# Patient Record
Sex: FEMALE | Race: WHITE | NOT HISPANIC OR LATINO | Employment: FULL TIME | URBAN - METROPOLITAN AREA
[De-identification: names, ages, dates, MRNs, and addresses within clinical notes are randomized per-mention and may not be internally consistent; named-entity substitution may affect disease eponyms.]

---

## 2017-02-03 ENCOUNTER — OFFICE VISIT (OUTPATIENT)
Dept: FAMILY MEDICINE | Facility: CLINIC | Age: 22
End: 2017-02-03
Payer: COMMERCIAL

## 2017-02-03 VITALS
HEIGHT: 62 IN | DIASTOLIC BLOOD PRESSURE: 76 MMHG | BODY MASS INDEX: 23.24 KG/M2 | OXYGEN SATURATION: 100 % | WEIGHT: 126.3 LBS | SYSTOLIC BLOOD PRESSURE: 108 MMHG | TEMPERATURE: 98.4 F | HEART RATE: 68 BPM

## 2017-02-03 DIAGNOSIS — Z00.00 ENCOUNTER FOR ROUTINE ADULT HEALTH EXAMINATION WITHOUT ABNORMAL FINDINGS: Primary | ICD-10-CM

## 2017-02-03 DIAGNOSIS — Z30.41 SURVEILLANCE FOR BIRTH CONTROL, ORAL CONTRACEPTIVES: ICD-10-CM

## 2017-02-03 PROCEDURE — 87591 N.GONORRHOEAE DNA AMP PROB: CPT | Performed by: NURSE PRACTITIONER

## 2017-02-03 PROCEDURE — 87491 CHLMYD TRACH DNA AMP PROBE: CPT | Performed by: NURSE PRACTITIONER

## 2017-02-03 PROCEDURE — G0145 SCR C/V CYTO,THINLAYER,RESCR: HCPCS | Performed by: NURSE PRACTITIONER

## 2017-02-03 PROCEDURE — 99395 PREV VISIT EST AGE 18-39: CPT | Performed by: NURSE PRACTITIONER

## 2017-02-03 RX ORDER — NORGESTIMATE AND ETHINYL ESTRADIOL 0.25-0.035
1 KIT ORAL DAILY
Qty: 84 TABLET | Refills: 3 | Status: CANCELLED | OUTPATIENT
Start: 2017-02-03

## 2017-02-03 RX ORDER — NORGESTIMATE AND ETHINYL ESTRADIOL 7DAYSX3 28
1 KIT ORAL DAILY
Qty: 84 TABLET | Refills: 3 | Status: SHIPPED | OUTPATIENT
Start: 2017-02-03 | End: 2018-01-05

## 2017-02-03 NOTE — NURSING NOTE
"Chief Complaint   Patient presents with     Physical       Initial /76 mmHg  Pulse 68  Temp(Src) 98.4  F (36.9  C) (Oral)  Ht 5' 1.5\" (1.562 m)  Wt 126 lb 4.8 oz (57.289 kg)  BMI 23.48 kg/m2  SpO2 100%  LMP 01/15/2017 Estimated body mass index is 23.48 kg/(m^2) as calculated from the following:    Height as of this encounter: 5' 1.5\" (1.562 m).    Weight as of this encounter: 126 lb 4.8 oz (57.289 kg).  BP completed using cuff size: meredith HERNANDEZ M.A.      "

## 2017-02-03 NOTE — MR AVS SNAPSHOT
After Visit Summary   2/3/2017    Ella Cavazos    MRN: [id number]           Patient Information     Date Of Birth          1995        Visit Information        Provider Department      2/3/2017 9:00 AM Shirin Hawkins Ra, [facility]        Today's Diagnoses     Surveillance for birth control, oral contraceptives    -  1     Encounter for routine adult health examination without abnormal findings           Care Instructions      Preventive Health Recommendations  Female Ages 18 to 25     Yearly exam:     See your health care provider every year in order to  o Review health changes.   o Discuss preventive care.    o Review your medicines if your doctor has prescribed any.      You should be tested each year for STDs (sexually transmitted diseases).       After age 20, talk to your provider about how often you should have cholesterol testing.      Starting at age 21, get a Pap test every three years. If you have an abnormal result, your doctor may have you test more often.      If you are at risk for diabetes, you should have a diabetes test (fasting glucose).     Shots:     Get a flu shot each year.     Get a tetanus shot every 10 years.     Consider getting the shot (vaccine) that prevents cervical cancer (Gardasil).    Nutrition:     Eat at least 5 servings of fruits and vegetables each day.    Eat whole-grain bread, whole-wheat pasta and brown rice instead of white grains and rice.    Talk to your provider about Calcium and Vitamin D.     Lifestyle    Exercise at least 150 minutes a week each week (30 minutes a day, 5 days a week). This will help you control your weight and prevent disease.    Limit alcohol to one drink per day.    No smoking.     Wear sunscreen to prevent skin cancer.    See your dentist every six months for an exam and cleaning.  Preventive Health Recommendations  Female Ages 18 to 25     Yearly exam:   See your health care provider every year in  order to  Review health changes.   Discuss preventive care.    Review your medicines if your doctor has prescribed any.    You should be tested each year for STDs (sexually transmitted diseases).     After age 20, talk to your provider about how often you should have cholesterol testing.    Starting at age 21, get a Pap test every three years. If you have an abnormal result, your doctor may have you test more often.    If you are at risk for diabetes, you should have a diabetes test (fasting glucose).     Shots:   Get a flu shot each year.   Get a tetanus shot every 10 years.   Consider getting the shot (vaccine) that prevents cervical cancer (Gardasil).    Nutrition:   Eat at least 5 servings of fruits and vegetables each day.  Eat whole-grain bread, whole-wheat pasta and brown rice instead of white grains and rice.  Talk to your provider about Calcium and Vitamin D.     Lifestyle  Exercise at least 150 minutes a week each week (30 minutes a day, 5 days a week). This will help you control your weight and prevent disease.  Limit alcohol to one drink per day.  No smoking.   Wear sunscreen to prevent skin cancer.  See your dentist every six months for an exam and cleaning.        Follow-ups after your visit        Who to contact     If you have questions or need follow up information about today's clinic visit or your schedule please contact [facility] directly at [phone].  Normal or non-critical lab and imaging results will be communicated to you by MyChart, letter or phone within 4 business days after the clinic has received the results. If you do not hear from us within 7 days, please contact the clinic through MyChart or phone. If you have a critical or abnormal lab result, we will notify you by phone as soon as possible.  Submit refill requests through happ[employer]! or call your pharmacy and they will forward the refill request to us. Please allow 3 business days for your refill to be completed.     "      Additional Information About Your Visit        MyChart Information     [employer] gives you secure access to your electronic health record. If you see a primary care provider, you can also send messages to your care team and make appointments. If you have questions, please call your primary care clinic.  If you do not have a primary care provider, please call [phone] and they will assist you.        Care EveryWhere ID     This is your Care EveryWhere ID. This could be used by other organizations to access your [facility] medical records  [id number]        Your Vitals Were     Pulse Temperature Height BMI (Body Mass Index) Pulse Oximetry Last Period    68 98.4  F (36.9  C) (Oral) 5' 1.5\" (1.562 m) 23.48 kg/m2 100% 01/15/2017       Blood Pressure from Last 3 Encounters:   02/03/17 108/76   02/05/16 108/64   09/04/15 104/62    Weight from Last 3 Encounters:   02/03/17 126 lb 4.8 oz (57.289 kg)   02/05/16 121 lb (54.885 kg)   09/04/15 119 lb 9.6 oz (54.25 kg)              We Performed the Following     Chlamydia trachomatis PCR     Neisseria gonorrhoeae PCR     Pap imaged thin layer screen only - recommended age 21 - 24 years          Today's Medication Changes          These changes are accurate as of: 2/3/17  9:48 AM.  If you have any questions, ask your nurse or doctor.               Start taking these medicines.        Dose/Directions    norgestim-eth estrad triphasic 0.18/0.215/0.25 MG-35 MCG per tablet   Commonly known as:  TRINESSA (28)   Used for:  Surveillance for birth control, oral contraceptives   Started by:  Shirin Hawkins Ra, APRN CNP        Dose:  1 tablet   Take 1 tablet by mouth daily   Quantity:  84 tablet   Refills:  3            Where to get your medicines      These medications were sent to [facility] [address] [place] [address] [address]  [address] [facility] [address]     Phone:  [phone]    - norgestim-eth estrad triphasic " 0.18/0.215/0.25 MG-35 MCG per tablet             Primary Care Provider    None Specified       No primary provider on file.        Thank you!     Thank you for choosing [facility]  for your care. Our goal is always to provide you with excellent care. Hearing back from our patients is one way we can continue to improve our services. Please take a few minutes to complete the written survey that you may receive in the mail after your visit with us. Thank you!             Your Updated Medication List - Protect others around you: Learn how to safely use, store and throw away your medicines at www.disposemymeds.org.          This list is accurate as of: 2/3/17  9:48 AM.  Always use your most recent med list.                   Brand Name Dispense Instructions for use    norgestim-eth estrad triphasic 0.18/0.215/0.25 MG-35 MCG per tablet    TRINESSA (28)    84 tablet    Take 1 tablet by mouth daily       norgestimate-ethinyl estradiol 0.25-35 MG-MCG per tablet    ORTHO-CYCLEN, SPRINTEC    84 tablet    Take 1 tablet by mouth daily

## 2017-02-03 NOTE — PROGRESS NOTES
"   SUBJECTIVE:     CC: Ella Cavazos is an 22 year old woman who presents for preventive health visit.     Healthy Habits:    Do you get at least three servings of calcium containing foods daily (dairy, green leafy vegetables, etc.)? {YES/NO, DAIRY INTAKE:843217::\"yes\"}    Amount of exercise or daily activities, outside of work: {AMOUNT EXERCISE:811510}    Problems taking medications regularly {Yes /No default:271189::\"No\"}    Medication side effects: {Yes /No default.:719862::\"No\"}    Have you had an eye exam in the past two years? {YESNOBLANK:432623}    Do you see a dentist twice per year? {YESNOBLANK:484547}    Do you have sleep apnea, excessive snoring or daytime drowsiness?{YESNOBLANK:264433}    {Outside tests to abstract? :264841}    {additional problems to add:326444}    Today's PHQ-2 Score:   PHQ-2 ( 1999 Pfizer) 1/31/2017 2/5/2016   Q1: Little interest or pleasure in doing things - 0   Q2: Feeling down, depressed or hopeless - 0   PHQ-2 Score - 0   Little interest or pleasure in doing things Not at all -   Feeling down, depressed or hopeless Not at all -   PHQ-2 Score 0 -     {PHQ-2 LOOK IN ASSESSMENTS :930968}  Abuse: Current or Past(Physical, Sexual or Emotional)- {YES/NO/NA:539135}  Do you feel safe in your environment - {YES/NO/NA:159316}    Social History   Substance Use Topics     Smoking status: Never Smoker      Smokeless tobacco: Not on file     Alcohol Use: 0.0 oz/week     0 Standard drinks or equivalent per week     {ETOH AUDIT:315835}    No results for input(s): CHOL, HDL, LDL, TRIG, CHOLHDLRATIO, NHDL in the last 13563 hours.    Reviewed orders with patient.  Reviewed health maintenance and updated orders accordingly - {Yes/No:077332::\"Yes\"}    Mammo Decision Support:  {Mammo:428245}    Pertinent mammograms are reviewed under the imaging tab.  History of abnormal Pap smear: {PAP HX:984636}  All Histories reviewed and updated in Epic.  {HISTORY OPTIONS:703583}    ROS:  {FEMALE PREVENTATIVE " "ROS:043769}    {CHRONICPROBDATA:992865}  OBJECTIVE:     There were no vitals taken for this visit.  EXAM:  {Exam Choices:144763}    ASSESSMENT/PLAN:     {Diag Picklist:019239}    COUNSELING:   {FEMALE COUNSELING MESSAGES:531843::\"Reviewed preventive health counseling, as reflected in patient instructions\"}    {Blood Pressure Screenin}     reports that she has never smoked. She does not have any smokeless tobacco history on file.  {Tobacco Cessation needed for ACO -- Delete if patient is a non-smoker:201402}  Estimated body mass index is 22.50 kg/(m^2) as calculated from the following:    Height as of 16: 5' 1.5\" (1.562 m).    Weight as of 16: 121 lb (54.885 kg).   {Weight Management Plan needed for ACO:749458}    Counseling Resources:  ATP IV Guidelines  Pooled Cohorts Equation Calculator  Breast Cancer Risk Calculator  FRAX Risk Assessment  ICSI Preventive Guidelines  Dietary Guidelines for Americans, 2010  USDA's MyPlate  ASA Prophylaxis  Lung CA Screening    [facility] Aviles Ra [facility]  "

## 2017-02-03 NOTE — PROGRESS NOTES
SUBJECTIVE:     CC: Ella Cavazos is an 22 year old woman who presents for preventive health visit.     Physical  Annual:     Getting at least 3 servings of Calcium per day::  NO    Bi-annual eye exam::  NO    Dental care twice a year::  NO    Sleep apnea or symptoms of sleep apnea::  None    Diet::  Regular (no restrictions)    Frequency of exercise::  1 day/week    Duration of exercise::  15-30 minutes    Taking medications regularly::  Yes    Medication side effects::  Not applicable    Additional concerns today::  No      OCP refill.  Sexually active, male parther.  No htn, blood clots, migraines, smoking.  Regular periods.  Prefers tri-sprintec, which she was on during college.    Also would like std screening.    Today's PHQ-2 Score:   PHQ-2 ( 1999 Pfizer) 2/3/2017   Q1: Little interest or pleasure in doing things 0   Q2: Feeling down, depressed or hopeless 0   PHQ-2 Score 0   Little interest or pleasure in doing things -   Feeling down, depressed or hopeless -   PHQ-2 Score -       Abuse: Current or Past(Physical, Sexual or Emotional)- No  Do you feel safe in your environment - Yes    Social History   Substance Use Topics     Smoking status: Never Smoker      Smokeless tobacco: Not on file     Alcohol Use: 0.0 oz/week     0 Standard drinks or equivalent per week     The patient does not drink >3 drinks per day nor >7 drinks per week.    No results for input(s): CHOL, HDL, LDL, TRIG, CHOLHDLRATIO, NHDL in the last 53966 hours.    Reviewed orders with patient.  Reviewed health maintenance and updated orders accordingly - Yes    Mammo Decision Support:  Mammogram not appropriate for this patient based on age.    Pertinent mammograms are reviewed under the imaging tab.  History of abnormal Pap smear: NO - age 21-29 PAP every 3 years recommended  All Histories reviewed and updated in Epic.      ROS:  C: NEGATIVE for fever, chills, change in weight  I: NEGATIVE for worrisome rashes, moles or lesions  E:  "NEGATIVE for vision changes or irritation  ENT: NEGATIVE for ear, mouth and throat problems  R: NEGATIVE for significant cough or SOB  B: NEGATIVE for masses, tenderness or discharge  CV: NEGATIVE for chest pain, palpitations or peripheral edema  GI: NEGATIVE for nausea, abdominal pain, heartburn, or change in bowel habits  : NEGATIVE for unusual urinary or vaginal symptoms. Periods are regular.  M: NEGATIVE for significant arthralgias or myalgia  N: NEGATIVE for weakness, dizziness or paresthesias  P: NEGATIVE for changes in mood or affect    Problem list, Medication list, Allergies, and Medical/Social/Surgical histories reviewed in [facility] and updated as appropriate.  Patient Active Problem List   Diagnosis     CARDIOVASCULAR SCREENING; LDL GOAL LESS THAN 160     Surveillance for birth control, oral contraceptives     Past Surgical History   Procedure Laterality Date     Dental surgery       wisdom teeth, uncomplicated       Social History   Substance Use Topics     Smoking status: Never Smoker      Smokeless tobacco: Not on file     Alcohol Use: 0.0 oz/week     0 Standard drinks or equivalent per week     Family History   Problem Relation Age of Onset     Colon Cancer Maternal Grandfather          OBJECTIVE:     /76 mmHg  Pulse 68  Temp(Src) 98.4  F (36.9  C) (Oral)  Ht 5' 1.5\" (1.562 m)  Wt 126 lb 4.8 oz (57.289 kg)  BMI 23.48 kg/m2  SpO2 100%  LMP 01/15/2017  EXAM:  GENERAL: healthy, alert and no distress  EYES: Eyes grossly normal to inspection, PERRL and conjunctivae and sclerae normal  HENT: ear canals and TM's normal, nose and mouth without ulcers or lesions  NECK: no adenopathy, no asymmetry, masses, or scars and thyroid normal to palpation  RESP: lungs clear to auscultation - no rales, rhonchi or wheezes  BREAST: normal without masses, tenderness or nipple discharge and no palpable axillary masses or adenopathy  CV: regular rate and rhythm, normal S1 S2, no S3 or S4, no murmur, click or rub, " "no peripheral edema and peripheral pulses strong  ABDOMEN: soft, nontender, no hepatosplenomegaly, no masses and bowel sounds normal   (female): normal female external genitalia, normal urethral meatus, vaginal mucosa pink, moist, well rugated, and normal cervix/adnexa/uterus without masses or discharge  MS: no gross musculoskeletal defects noted, no edema  SKIN: no suspicious lesions or rashes  NEURO: Normal strength and tone, mentation intact and speech normal  PSYCH: mentation appears normal, affect normal/bright    ASSESSMENT/PLAN:     1. Encounter for routine adult health examination without abnormal findings  22 y.o. Female, here for WWE.  No concerns.  - Pap imaged thin layer screen only - recommended age 21 - 24 years  - Neisseria gonorrhoeae PCR  - Chlamydia trachomatis PCR    2. Surveillance for birth control, oral contraceptives  No known contraindications.  Refilled.  - norgestim-eth estrad triphasic (TRINESSA, 28,) 0.18/0.215/0.25 MG-35 MCG per tablet; Take 1 tablet by mouth daily  Dispense: 84 tablet; Refill: 3    COUNSELING:  Reviewed preventive health counseling, as reflected in patient instructions       Regular exercise       Healthy diet/nutrition       Contraception         reports that she has never smoked. She does not have any smokeless tobacco history on file.    Estimated body mass index is 23.48 kg/(m^2) as calculated from the following:    Height as of this encounter: 5' 1.5\" (1.562 m).    Weight as of this encounter: 126 lb 4.8 oz (57.289 kg).       Counseling Resources:  ATP IV Guidelines  Pooled Cohorts Equation Calculator  Breast Cancer Risk Calculator  FRAX Risk Assessment  ICSI Preventive Guidelines  Dietary Guidelines for Americans, 2010  USDA's MyPlate  ASA Prophylaxis  Lung CA Screening    Shirin Hawkins, APRN CNP  [facility]    "

## 2017-02-03 NOTE — PATIENT INSTRUCTIONS
Preventive Health Recommendations  Female Ages 18 to 25     Yearly exam:     See your health care provider every year in order to  o Review health changes.   o Discuss preventive care.    o Review your medicines if your doctor has prescribed any.      You should be tested each year for STDs (sexually transmitted diseases).       After age 20, talk to your provider about how often you should have cholesterol testing.      Starting at age 21, get a Pap test every three years. If you have an abnormal result, your doctor may have you test more often.      If you are at risk for diabetes, you should have a diabetes test (fasting glucose).     Shots:     Get a flu shot each year.     Get a tetanus shot every 10 years.     Consider getting the shot (vaccine) that prevents cervical cancer (Gardasil).    Nutrition:     Eat at least 5 servings of fruits and vegetables each day.    Eat whole-grain bread, whole-wheat pasta and brown rice instead of white grains and rice.    Talk to your provider about Calcium and Vitamin D.     Lifestyle    Exercise at least 150 minutes a week each week (30 minutes a day, 5 days a week). This will help you control your weight and prevent disease.    Limit alcohol to one drink per day.    No smoking.     Wear sunscreen to prevent skin cancer.    See your dentist every six months for an exam and cleaning.  Preventive Health Recommendations  Female Ages 18 to 25     Yearly exam:   See your health care provider every year in order to  Review health changes.   Discuss preventive care.    Review your medicines if your doctor has prescribed any.    You should be tested each year for STDs (sexually transmitted diseases).     After age 20, talk to your provider about how often you should have cholesterol testing.    Starting at age 21, get a Pap test every three years. If you have an abnormal result, your doctor may have you test more often.    If you are at risk for diabetes, you should have a  diabetes test (fasting glucose).     Shots:   Get a flu shot each year.   Get a tetanus shot every 10 years.   Consider getting the shot (vaccine) that prevents cervical cancer (Gardasil).    Nutrition:   Eat at least 5 servings of fruits and vegetables each day.  Eat whole-grain bread, whole-wheat pasta and brown rice instead of white grains and rice.  Talk to your provider about Calcium and Vitamin D.     Lifestyle  Exercise at least 150 minutes a week each week (30 minutes a day, 5 days a week). This will help you control your weight and prevent disease.  Limit alcohol to one drink per day.  No smoking.   Wear sunscreen to prevent skin cancer.  See your dentist every six months for an exam and cleaning.

## 2017-02-05 LAB
C TRACH DNA SPEC QL NAA+PROBE: NORMAL
N GONORRHOEA DNA SPEC QL NAA+PROBE: NORMAL
SPECIMEN SOURCE: NORMAL
SPECIMEN SOURCE: NORMAL

## 2017-02-07 LAB
COPATH REPORT: NORMAL
PAP: NORMAL

## 2018-01-05 ENCOUNTER — OFFICE VISIT (OUTPATIENT)
Dept: FAMILY MEDICINE | Facility: CLINIC | Age: 23
End: 2018-01-05
Payer: COMMERCIAL

## 2018-01-05 VITALS
BODY MASS INDEX: 22.9 KG/M2 | SYSTOLIC BLOOD PRESSURE: 100 MMHG | RESPIRATION RATE: 20 BRPM | DIASTOLIC BLOOD PRESSURE: 62 MMHG | WEIGHT: 121.3 LBS | TEMPERATURE: 96.7 F | HEIGHT: 61 IN | OXYGEN SATURATION: 99 % | HEART RATE: 73 BPM

## 2018-01-05 DIAGNOSIS — B37.31 YEAST VAGINITIS: ICD-10-CM

## 2018-01-05 DIAGNOSIS — Z30.41 SURVEILLANCE FOR BIRTH CONTROL, ORAL CONTRACEPTIVES: ICD-10-CM

## 2018-01-05 DIAGNOSIS — Z11.3 SCREENING EXAMINATION FOR VENEREAL DISEASE: Primary | ICD-10-CM

## 2018-01-05 LAB
GLUCOSE BLD-MCNC: 87 MG/DL (ref 70–99)
SPECIMEN SOURCE: ABNORMAL
WET PREP SPEC: ABNORMAL

## 2018-01-05 PROCEDURE — 87491 CHLMYD TRACH DNA AMP PROBE: CPT | Performed by: NURSE PRACTITIONER

## 2018-01-05 PROCEDURE — 87210 SMEAR WET MOUNT SALINE/INK: CPT | Performed by: NURSE PRACTITIONER

## 2018-01-05 PROCEDURE — 99213 OFFICE O/P EST LOW 20 MIN: CPT | Performed by: NURSE PRACTITIONER

## 2018-01-05 PROCEDURE — 36415 COLL VENOUS BLD VENIPUNCTURE: CPT | Performed by: NURSE PRACTITIONER

## 2018-01-05 PROCEDURE — 82947 ASSAY GLUCOSE BLOOD QUANT: CPT | Performed by: NURSE PRACTITIONER

## 2018-01-05 PROCEDURE — 87591 N.GONORRHOEAE DNA AMP PROB: CPT | Performed by: NURSE PRACTITIONER

## 2018-01-05 RX ORDER — FLUCONAZOLE 150 MG/1
150 TABLET ORAL
Qty: 4 TABLET | Refills: 0 | Status: SHIPPED | OUTPATIENT
Start: 2018-01-05 | End: 2021-01-08

## 2018-01-05 RX ORDER — NORGESTIMATE AND ETHINYL ESTRADIOL 7DAYSX3 28
1 KIT ORAL DAILY
Qty: 84 TABLET | Refills: 3 | Status: SHIPPED | OUTPATIENT
Start: 2018-01-05 | End: 2018-11-19

## 2018-01-05 NOTE — NURSING NOTE
"Chief Complaint   Patient presents with     Contraception     Vaginal Problem     STD       Initial /62 (BP Location: Right arm, Patient Position: Chair, Cuff Size: Adult Regular)  Pulse 73  Temp 96.7  F (35.9  C) (Tympanic)  Resp 20  Ht 5' 1\" (1.549 m)  Wt 121 lb 4.8 oz (55 kg)  LMP 12/19/2017 (Approximate)  SpO2 99%  BMI 22.92 kg/m2 Estimated body mass index is 22.92 kg/(m^2) as calculated from the following:    Height as of this encounter: 5' 1\" (1.549 m).    Weight as of this encounter: 121 lb 4.8 oz (55 kg).  Medication Reconciliation: complete     Caridad Llamas CMA      "

## 2018-01-05 NOTE — MR AVS SNAPSHOT
After Visit Summary   1/5/2018    Ella Cavazos    MRN: [id number]           Patient Information     Date Of Birth          1995        Visit Information        Provider Department      1/5/2018 1:20 PM Silvia Goins APRN CNP [facility]        Today's Diagnoses     Screening examination for venereal disease    -  1    Yeast vaginitis        Surveillance for birth control, oral contraceptives          Care Instructions    Take one pill of diflucan today. Repeat pill once weekly for 4 weeks.    You can also use monistat.     Return to clinic if your symptoms come back. You can see me or make a lab only appointment.          Follow-ups after your visit        Who to contact     If you have questions or need follow up information about today's clinic visit or your schedule please contact [facility] directly at [phone].  Normal or non-critical lab and imaging results will be communicated to you by MyChart, letter or phone within 4 business days after the clinic has received the results. If you do not hear from us within 7 days, please contact the clinic through Shareaholichart or phone. If you have a critical or abnormal lab result, we will notify you by phone as soon as possible.  Submit refill requests through STAR FESTIVAL or call your pharmacy and they will forward the refill request to us. Please allow 3 business days for your refill to be completed.          Additional Information About Your Visit        Shareaholichart Information     STAR FESTIVAL gives you secure access to your electronic health record. If you see a primary care provider, you can also send messages to your care team and make appointments. If you have questions, please call your primary care clinic.  If you do not have a primary care provider, please call [phone] and they will assist you.        Care EveryWhere ID     This is your Care EveryWhere ID. This could be used by other organizations to access your  "[facility] medical records  [id number]        Your Vitals Were     Pulse Temperature Respirations Height Last Period Pulse Oximetry    73 96.7  F (35.9  C) (Tympanic) 20 5' 1\" (1.549 m) 12/19/2017 (Approximate) 99%    BMI (Body Mass Index)                   22.92 kg/m2            Blood Pressure from Last 3 Encounters:   01/05/18 100/62   02/03/17 108/76   02/05/16 108/64    Weight from Last 3 Encounters:   01/05/18 121 lb 4.8 oz (55 kg)   02/03/17 126 lb 4.8 oz (57.3 kg)   02/05/16 121 lb (54.9 kg)              We Performed the Following     CHLAMYDIA TRACHOMATIS PCR     NEISSERIA GONORRHOEA PCR     Wet prep          Today's Medication Changes          These changes are accurate as of: 1/5/18  2:11 PM.  If you have any questions, ask your nurse or doctor.               Start taking these medicines.        Dose/Directions    fluconazole 150 MG tablet   Commonly known as:  DIFLUCAN   Used for:  Yeast vaginitis   Started by:  Silvia Goins APRN CNP        Dose:  150 mg   Take 1 tablet (150 mg) by mouth every 7 days   Quantity:  4 tablet   Refills:  0            Where to get your medicines      These medications were sent to [facility] [address] [place] [address] [address] [address]     Phone:  [phone]     fluconazole 150 MG tablet    norgestim-eth estrad triphasic 0.18/0.215/0.25 MG-35 MCG per tablet                Primary Care Provider Office Phone # Fax #    EFRAIN Nunes [phone]       [facility] [address] [facility] [address]        Equal Access to Services     [facility] ANTONY AH: Robbie cadena Sowanda, waaxda luqadaha, qaybta kaalmada jessicaegyada, irasema hurd. So [facility] [phone].    ATENCIÓN: Si habla español, tiene a kelly disposición servicios gratuitos de asistencia lingüística. Llame al [phone].    We comply with applicable federal civil rights " laws and [place] laws. We do not discriminate on the basis of race, color, national origin, age, disability, sex, sexual orientation, or gender identity.            Thank you!     Thank you for choosing [facility]  for your care. Our goal is always to provide you with excellent care. Hearing back from our patients is one way we can continue to improve our services. Please take a few minutes to complete the written survey that you may receive in the mail after your visit with us. Thank you!             Your Updated Medication List - Protect others around you: Learn how to safely use, store and throw away your medicines at www.disposemymeds.org.          This list is accurate as of: 1/5/18  2:11 PM.  Always use your most recent med list.                   Brand Name Dispense Instructions for use Diagnosis    fluconazole 150 MG tablet    DIFLUCAN    4 tablet    Take 1 tablet (150 mg) by mouth every 7 days    Yeast vaginitis       norgestim-eth estrad triphasic 0.18/0.215/0.25 MG-35 MCG per tablet    TRINESSA (28)    84 tablet    Take 1 tablet by mouth daily    Surveillance for birth control, oral contraceptives

## 2018-01-05 NOTE — PROGRESS NOTES
SUBJECTIVE:   Ella Cavazos is a 22 year old female who presents to clinic today for the following health issues:    Vaginal Symptoms      Duration: 5 Months Comes and Goes    Description  vaginal discharge - white creamy, itching and pain with intercourse    Intensity:  moderate    Accompanying signs and symptoms (fever/dysuria/abdominal or back pain): None    History  Sexually active: yes, single partner, contraception - oral contraceptives (combined)  Possibility of pregnancy: No  Recent antibiotic use: no     Precipitating or alleviating factors: None    Therapies tried and outcome: none   Outcome: NA    Patient states she believes she has a yeast infection that comes and goes, but would like to be tested as she has not had a vaginal culture taken. She reports symptoms are sometimes significant before her period, but gone after menses subsides. Denies sores, fever, abdominal pain, or back pain. Of note, patient says her boyfriend got a yeast infection a few weeks ago, and his symptoms were relieved by OTC medications.      States she has been thirsty a lot recently, but hasn't been drinking much water. Denies FMHx of type 1 diabetes.     Birth Control  Ella would like her OCP Rx refilled. She inquires about other birth control options that would not change the occurrence/regularity of her period, but desires to stay with her current BC.     Tobacco  Allergies  Meds  Problems  Med Hx  Surg Hx  Fam Hx  Soc Hx        Allergies  Meds  Problems       Patient Active Problem List   Diagnosis     CARDIOVASCULAR SCREENING; LDL GOAL LESS THAN 160     Surveillance for birth control, oral contraceptives     Past Surgical History:   Procedure Laterality Date     DENTAL SURGERY      wisdom teeth, uncomplicated       Social History   Substance Use Topics     Smoking status: Never Smoker     Smokeless tobacco: Never Used     Alcohol use 0.0 oz/week     0 Standard drinks or equivalent per week     Family History  "  Problem Relation Age of Onset     Hypertension Mother      Hyperlipidemia Mother      Colon Cancer Maternal Grandfather            ROS:  C: NEGATIVE for fever, chills, change in weight  E/M: NEGATIVE for ear, mouth and throat problems  R: NEGATIVE for significant cough or SOB  CV: NEGATIVE for chest pain, palpitations or peripheral edema  GI: NEGATIVE for nausea, abdominal pain, heartburn, or change in bowel habits  : Use of norgestim-eth estrad triphasic (Trinessa); normal menstrual cycles.  PSYCHIATRIC: NEGATIVE for changes in mood or affect    This document serves as a record of the services and decisions personally performed and made by EFRAIN Nunes CNP. It was created on his/her behalf by Brittanie Barnett, a trained medical scribe. The creation of this document is based the provider's statements to the medical scribe.  Marco Antonio Barnett 1:42 PM, January 5, 2018    OBJECTIVE:                                                    /62 (BP Location: Right arm, Patient Position: Chair, Cuff Size: Adult Regular)  Pulse 73  Temp 96.7  F (35.9  C) (Tympanic)  Resp 20  Ht 5' 1\" (1.549 m)  Wt 121 lb 4.8 oz (55 kg)  LMP 12/19/2017 (Approximate)  SpO2 99%  BMI 22.92 kg/m2  Body mass index is 22.92 kg/(m^2).  GENERAL: healthy, alert and no distress  NEURO: Normal strength and tone, mentation intact and speech normal  PSYCH: mentation appears normal, affect normal/bright    Diagnostic Test Results:  Results for orders placed or performed in visit on 01/05/18 (from the past 24 hour(s))   Wet prep   Result Value Ref Range    Specimen Description Vagina     Wet Prep No Trichomonas seen     Wet Prep No clue cells seen     Wet Prep Yeast seen (A)    Glucose, whole blood   Result Value Ref Range    Glucose Whole Blood 87 70 - 99 mg/dL        ASSESSMENT/PLAN:                                                    A/P:    ICD-10-CM    1. Screening examination for venereal disease Z11.3 NEISSERIA GONORRHOEA " PCR     CHLAMYDIA TRACHOMATIS PCR     Wet prep   2. Yeast vaginitis B37.3 fluconazole (DIFLUCAN) 150 MG tablet     Glucose, whole blood   3. Surveillance for birth control, oral contraceptives Z30.41 norgestim-eth estrad triphasic (TRINESSA, 28,) 0.18/0.215/0.25 MG-35 MCG per tablet     Recurrent yeast vaginitis with partner symptoms as well.  Partner treating with topical and will give serial dosing of diflucan for pt.    Patient Instructions   Take one pill of diflucan today. Repeat pill once weekly for 4 weeks.    You can also use monistat.     Return to clinic if your symptoms come back. You can see me or make a lab only appointment.    The information in this document, created by the medical scribe for me, accurately reflects the services I personally performed and the decisions made by me. I have reviewed and approved this document for accuracy prior to leaving the patient care area.  2:11 PM, 01/05/18    EFRAIN Nunes [facility]

## 2018-01-05 NOTE — PATIENT INSTRUCTIONS
Take one pill of diflucan today. Repeat pill once weekly for 4 weeks.    You can also use monistat.     Return to clinic if your symptoms come back. You can see me or make a lab only appointment.

## 2018-01-08 LAB
C TRACH DNA SPEC QL NAA+PROBE: POSITIVE
N GONORRHOEA DNA SPEC QL NAA+PROBE: NEGATIVE
SPECIMEN SOURCE: ABNORMAL
SPECIMEN SOURCE: NORMAL

## 2018-01-09 ENCOUNTER — TELEPHONE (OUTPATIENT)
Dept: FAMILY MEDICINE | Facility: CLINIC | Age: 23
End: 2018-01-09

## 2018-01-09 DIAGNOSIS — A74.9 CHLAMYDIA INFECTION: Primary | ICD-10-CM

## 2018-01-09 RX ORDER — AZITHROMYCIN 1 G/1
1 POWDER, FOR SUSPENSION ORAL ONCE
Qty: 1 EACH | Refills: 0 | Status: SHIPPED | OUTPATIENT
Start: 2018-01-09 | End: 2018-01-09

## 2018-01-09 NOTE — TELEPHONE ENCOUNTER
Covering MD  PCP out of clinic.   1/5/2018 OV reviewed.  Pt with vaginal symptoms , partner as well.  Pos yeast at appt but now note pos Chlamydia.  Partner needs treatment.   Pt needs Azithromycin 1000 mg x one dose.  Reportable to [place] [facility] .  Rx sent to pharmacy on file. Please contact pt and let her know about RX and that partner/s need treatment.   No intercourse until all treated to avoid reinfection.    Dinorah Padilla MD  Internal Medicine  electronically signed

## 2018-01-09 NOTE — TELEPHONE ENCOUNTER
Patient calls back, she is notified of results, will notify partner.  Ivana Dillard, RN  Triage Nurse

## 2018-01-09 NOTE — TELEPHONE ENCOUNTER
Called patient to let her know results.  Left message for her to call us back.  Ivana Dillard, RN  Triage Nurse

## 2018-11-19 ENCOUNTER — OFFICE VISIT (OUTPATIENT)
Dept: OBGYN | Facility: CLINIC | Age: 23
End: 2018-11-19
Payer: COMMERCIAL

## 2018-11-19 VITALS
SYSTOLIC BLOOD PRESSURE: 100 MMHG | DIASTOLIC BLOOD PRESSURE: 58 MMHG | HEIGHT: 61 IN | WEIGHT: 117 LBS | BODY MASS INDEX: 22.09 KG/M2

## 2018-11-19 DIAGNOSIS — Z86.19 HISTORY OF CHLAMYDIA INFECTION: ICD-10-CM

## 2018-11-19 DIAGNOSIS — Z11.3 ROUTINE SCREENING FOR STI (SEXUALLY TRANSMITTED INFECTION): ICD-10-CM

## 2018-11-19 DIAGNOSIS — Z30.41 SURVEILLANCE FOR BIRTH CONTROL, ORAL CONTRACEPTIVES: ICD-10-CM

## 2018-11-19 DIAGNOSIS — Z01.419 WELL WOMAN EXAM WITH ROUTINE GYNECOLOGICAL EXAM: Primary | ICD-10-CM

## 2018-11-19 PROCEDURE — 99385 PREV VISIT NEW AGE 18-39: CPT | Performed by: ADVANCED PRACTICE MIDWIFE

## 2018-11-19 PROCEDURE — 87591 N.GONORRHOEAE DNA AMP PROB: CPT | Performed by: ADVANCED PRACTICE MIDWIFE

## 2018-11-19 PROCEDURE — 87491 CHLMYD TRACH DNA AMP PROBE: CPT | Performed by: ADVANCED PRACTICE MIDWIFE

## 2018-11-19 RX ORDER — NORGESTIMATE AND ETHINYL ESTRADIOL 7DAYSX3 28
1 KIT ORAL DAILY
Qty: 84 TABLET | Refills: 3 | Status: SHIPPED | OUTPATIENT
Start: 2018-11-19 | End: 2019-12-17

## 2018-11-19 NOTE — PROGRESS NOTES
Ella is a 23 year old  female who presents for annual exam.     Besides routine health maintenance,  she would like to discuss birth control.  HPI:  Here for annual exam.  Would like STI screening. Pap UTD. Patient concerned that her menses is coming on  of placebo week vs Tuesday, denies break through bleeding  Or changes in bleeding.    Also noted occ bright red bleeding when straining for Bowel movement, reports intermittent constipation. Uncertain if certain foods cause increased constipation. Started 1.5 years ago.   Menses are regular q 28-30 days and normal-light lasting 5 days.   Menses flow: normal and light.    Patient's last menstrual period was 10/23/2018 (exact date)..   Using oral contraceptives for contraception.    She is not currently considering pregnancy.    REPRODUCTIVE/GYNECOLOGIC HISTORY:  Ella is sexually active with male partner(s) and is currently in monogamous relationship.   STI testing offered?  Accepted  History of abnormal Pap smear:  No  SOCIAL HISTORY  Abuse: does not report having previously been physical or sexually abused.    Do you feel safe in your environment? YES    She  reports that she has never smoked. She has never used smokeless tobacco.      Last PHQ-9 score on record = No flowsheet data found.  Last GAD7 score on record = No flowsheet data found.  Alcohol Score = no    HEALTH MAINTENANCE:  Cholesterol: (No results found for: CHOL History of abnormal lipids: No  Mammo: na . History of abnormal Mammo: Not applicable.  Regular Self Breast Exams: No  TSH: (No results found for: TSH )  Pap; (  Lab Results   Component Value Date    PAP NIL 2017    )  Immunizations up to date: yes  (Gardasil, Tdap, Flu)  Health maintenance updated:  yes    HEALTHY HABITS  Eating habits: follows a balanced nutrition diet  Calcium/Vitamin D intake: source:  dairy    Exercise: How often do you exercise? na  Have you had an eye exam in the last two years? NO  (Recommended)  Do you routinely see the dentist once or twice yearly? YES      HISTORY:  Obstetric History       T0      L0     SAB0   TAB0   Ectopic0   Multiple0   Live Births0         Past Medical History:   Diagnosis Date     NO ACTIVE PROBLEMS      Past Surgical History:   Procedure Laterality Date     DENTAL SURGERY      wisdom teeth, uncomplicated     Family History   Problem Relation Age of Onset     Hypertension Mother      Hyperlipidemia Mother      Colon Cancer Maternal Grandfather      Social History     Social History     Marital status: Single     Spouse name: N/A     Number of children: N/A     Years of education: N/A     Social History Main Topics     Smoking status: Never Smoker     Smokeless tobacco: Never Used     Alcohol use 0.0 oz/week     0 Standard drinks or equivalent per week     Drug use: No     Sexual activity: Yes     Partners: Male     Birth control/ protection: Pill     Other Topics Concern     None     Social History Narrative       Current Outpatient Prescriptions:      fluconazole (DIFLUCAN) 150 MG tablet, Take 1 tablet (150 mg) by mouth every 7 days, Disp: 4 tablet, Rfl: 0     norgestim-eth estrad triphasic (TRINESSA, 28,) 0.18/0.215/0.25 MG-35 MCG per tablet, Take 1 tablet by mouth daily, Disp: 84 tablet, Rfl: 3     Allergies   Allergen Reactions     Sulfa Drugs        Past medical, surgical, social and family history were reviewed and updated in [facility].    ROS:   CONSTITUTIONAL: NEGATIVE for fever, chills, change in weight  INTEGUMENTARU/SKIN: NEGATIVE for worrisome rashes, moles or lesions  EYES: NEGATIVE for vision changes or irritation  ENT: NEGATIVE for ear, mouth and throat problems  RESP: NEGATIVE for significant cough or SOB  BREAST: NEGATIVE for masses, tenderness or discharge  CV: NEGATIVE for chest pain, palpitations or peripheral edema  GI: NEGATIVE for nausea, abdominal pain, heartburn, or change in bowel habits  : NEGATIVE for unusual urinary or vaginal  "symptoms. Periods are regular.  MUSCULOSKELETAL: NEGATIVE for significant arthralgias or myalgia  NEURO: NEGATIVE for weakness, dizziness or paresthesias  PSYCHIATRIC: NEGATIVE for changes in mood or affect    PHYSICAL EXAM:  /58 (BP Location: Right arm, Patient Position: Sitting, Cuff Size: Adult Regular)  Ht 5' 1\" (1.549 m)  Wt 117 lb (53.1 kg)  LMP 10/23/2018 (Exact Date)  Breastfeeding? No  BMI 22.11 kg/m2   BMI: Body mass index is 22.11 kg/(m^2).  Constitutional: healthy, alert and no distress  Neck: symmetrical, thyroid normal size, no masses present, no lymphadenopathy present.   Cardiovascular: RRR, no murmurs present  Respiratory: breathing unlabored, lungs CTA bilaterally  Breast:normal without masses, tenderness or nipple discharge and no palpable axillary masses or adenopathy  Gastrointestinal: abdomen soft, non-tender, bowel sounds present  PELVIC EXAM:  Vulva: No lesions, no adenopathy, BUS WNL  Vagina: Moist, pink, discharge normal  well rugated, no lesions  Cervix:smooth, pink, no visible lesions  Uterus: Normal size, non-tender, mobile  Ovaries: No masses palpated  Rectal exam: deferred    ASSESSMENT/PLAN:  Return to clinic 1 year for well woman exam.   (Z01.419) Well woman exam with routine gynecological exam  (primary encounter diagnosis)  Comment: wnl  Plan: norgestim-eth estrad triphasic (TRINESSA, 28,)         0.18/0.215/0.25 MG-35 MCG per tablet        -Reviewed ACHES, neg FMH and personal hx, remains good candidate for COLLIN      (Z30.41) Surveillance for birth control, oral contraceptives  Comment: ordered   Plan: norgestim-eth estrad triphasic (TRINESSA, 28,)         0.18/0.215/0.25 MG-35 MCG per tablet        Discussed menses coming on Sun vs Tuesday, reviewed that external factors including diet, sleep, exercise, stress can impact cycle. Denies break through bleeding, intramenstrual bleeding or spotting.   -Given patient is bleeding for same amount of time, likely not " significant. Takes pill same time very day, does not miss pills  -Patient desires to stay on COLLIN, refilled         COUNSELING:   Reviewed preventive health counseling, as reflected in patient instructions       Regular exercise       Healthy diet/nutrition   Constipation. Discussed increasing fluids, fruits and vegetables. Reviewed OTC fiber supplements and stool softeners for bowels. Encouraged patient to track foods that may cause increased constipation. return to clinic if symptoms worsen. Likely internal hemorrhoids r/t constipation.     reports that she has never smoked. She has never used smokeless tobacco.      EFRAIN Vega, CNM

## 2018-11-19 NOTE — NURSING NOTE
"Chief Complaint   Patient presents with     Gyn Exam     STD       Initial /58 (BP Location: Right arm, Patient Position: Sitting, Cuff Size: Adult Regular)  Ht 5' 1\" (1.549 m)  Wt 117 lb (53.1 kg)  LMP 10/23/2018 (Exact Date)  Breastfeeding? No  BMI 22.11 kg/m2 Estimated body mass index is 22.11 kg/(m^2) as calculated from the following:    Height as of this encounter: 5' 1\" (1.549 m).    Weight as of this encounter: 117 lb (53.1 kg).  BP completed using cuff size: regular    Questioned patient about current smoking habits.  Pt. has never smoked.          The following HM Due: NONE    Laith William CMA                "

## 2018-11-19 NOTE — MR AVS SNAPSHOT
After Visit Summary   11/19/2018    Ella Cavazos    MRN: [id number]           Patient Information     Date Of Birth          1995        Visit Information        Provider Department      11/19/2018 5:00 PM Mary Grace Ambrose APRN [facility]        Today's Diagnoses     Well woman exam with routine gynecological exam    -  1    Surveillance for birth control, oral contraceptives           Follow-ups after your visit        Follow-up notes from your care team     Return in about 1 year (around 11/19/2019), or if symptoms worsen or fail to improve.      Who to contact     If you have questions or need follow up information about today's clinic visit or your schedule please contact [facility] directly at [phone].  Normal or non-critical lab and imaging results will be communicated to you by MyVersehart, letter or phone within 4 business days after the clinic has received the results. If you do not hear from us within 7 days, please contact the clinic through MyVersehart or phone. If you have a critical or abnormal lab result, we will notify you by phone as soon as possible.  Submit refill requests through WebRadar or call your pharmacy and they will forward the refill request to us. Please allow 3 business days for your refill to be completed.          Additional Information About Your Visit        MyChart Information     WebRadar gives you secure access to your electronic health record. If you see a primary care provider, you can also send messages to your care team and make appointments. If you have questions, please call your primary care clinic.  If you do not have a primary care provider, please call [phone] and they will assist you.        Care EveryWhere ID     This is your Care EveryWhere ID. This could be used by other organizations to access your [facility] medical records  [id number]        Your Vitals Were     Height Last Period Breastfeeding? BMI (Body Mass  "Index)          5' 1\" (1.549 m) 10/23/2018 (Exact Date) No 22.11 kg/m2         Blood Pressure from Last 3 Encounters:   11/19/18 100/58   01/05/18 100/62   02/03/17 108/76    Weight from Last 3 Encounters:   11/19/18 117 lb (53.1 kg)   01/05/18 121 lb 4.8 oz (55 kg)   02/03/17 126 lb 4.8 oz (57.3 kg)              Today, you had the following     No orders found for display         Where to get your medicines      These medications were sent to [facility] [address] [place] [address] [address] [facility].  [address] [facility], [facility] [address]     Phone:  [phone]     norgestim-eth estrad triphasic 0.18/0.215/0.25 MG-35 MCG per tablet          Primary Care Provider Office Phone # Fax #    Silvia EFRAIN Goins [phone] [address] [facility] [address]        Equal Access to Services     [facility]: Hadii aad ku hadasho Soomaali, waaxda luqadaha, qaybta kaalmada adeegyada, irasema castro . So [facility] [phone].    ATENCIÓN: Si habla español, tiene a kelly disposición servicios gratuitos de asistencia lingüística. [facility] [phone].    We comply with applicable federal civil rights laws and [place] laws. We do not discriminate on the basis of race, color, national origin, age, disability, sex, sexual orientation, or gender identity.            Thank you!     Thank you for choosing [facility]  for your care. Our goal is always to provide you with excellent care. Hearing back from our patients is one way we can continue to improve our services. Please take a few minutes to complete the written survey that you may receive in the mail after your visit with us. Thank you!             Your Updated Medication List - Protect others around you: Learn how to safely use, store and throw away your medicines at www.disposemymeds.org.          This list is accurate as of 11/19/18  6:01 PM.  Always use your most recent med " list.                   Brand Name Dispense Instructions for use Diagnosis    fluconazole 150 MG tablet    DIFLUCAN    4 tablet    Take 1 tablet (150 mg) by mouth every 7 days    Yeast vaginitis       norgestim-eth estrad triphasic 0.18/0.215/0.25 MG-35 MCG per tablet    TRINESSA (28)    84 tablet    Take 1 tablet by mouth daily    Surveillance for birth control, oral contraceptives, Well woman exam with routine gynecological exam

## 2018-11-21 LAB
C TRACH DNA SPEC QL NAA+PROBE: NEGATIVE
N GONORRHOEA DNA SPEC QL NAA+PROBE: NEGATIVE
SPECIMEN SOURCE: NORMAL
SPECIMEN SOURCE: NORMAL

## 2019-07-08 ENCOUNTER — OFFICE VISIT (OUTPATIENT)
Dept: OBGYN | Facility: CLINIC | Age: 24
End: 2019-07-08
Payer: COMMERCIAL

## 2019-07-08 VITALS — DIASTOLIC BLOOD PRESSURE: 58 MMHG | SYSTOLIC BLOOD PRESSURE: 100 MMHG | WEIGHT: 116.9 LBS | BODY MASS INDEX: 22.09 KG/M2

## 2019-07-08 DIAGNOSIS — Z01.419 ENCOUNTER FOR GYNECOLOGICAL EXAMINATION WITHOUT ABNORMAL FINDING: Primary | ICD-10-CM

## 2019-07-08 PROCEDURE — 99213 OFFICE O/P EST LOW 20 MIN: CPT | Performed by: ADVANCED PRACTICE MIDWIFE

## 2019-07-08 NOTE — PROGRESS NOTES
SUBJECTIVE:                                                   Ella Cavazos is a 24 year old who presents to clinic today for the following health issue(s):  Patient presents with:  Vaginal Problem: Tampon  remnants possilbly stuck  2 days ago        HPI:  Patient here with c/o retained tampon. States she was on a boat on Sat unable to change tampon for several hours, when removed noticed the tampon was not intact. Concern for partial retained tampon. Denies vaginal discharge or odor, menses is discontinued.     Patient's last menstrual period was 2019 (exact date).  Menstrual History: frequency: every 28-30 days  Patient is sexually active  .  Using oral contraceptives for contraception.   Health maintenance updated:  yes  STI infx testing offered:  Declined    Last PHQ-9 score on record = No flowsheet data found.  Last GAD7 score on record = No flowsheet data found.      Problem list and histories reviewed & adjusted, as indicated.  Additional history: as documented.    Patient Active Problem List   Diagnosis     CARDIOVASCULAR SCREENING; LDL GOAL LESS THAN 160     Surveillance for birth control, oral contraceptives     History of chlamydia infection     Past Surgical History:   Procedure Laterality Date     DENTAL SURGERY      wisdom teeth, uncomplicated      Social History     Tobacco Use     Smoking status: Never Smoker     Smokeless tobacco: Never Used   Substance Use Topics     Alcohol use: Yes     Alcohol/week: 0.0 oz      Problem (# of Occurrences) Relation (Name,Age of Onset)    Colon Cancer (1) Maternal Grandfather (Justin Moore)    Hyperlipidemia (1) Mother (Enedina Cavazos)    Hypertension (1) Mother (Enedina Cavazos)            Current Outpatient Medications   Medication Sig     norgestim-eth estrad triphasic (TRINESSA, 28,) 0.18/0.215/0.25 MG-35 MCG per tablet Take 1 tablet by mouth daily     fluconazole (DIFLUCAN) 150 MG tablet Take 1 tablet (150 mg) by mouth every 7 days (Patient not  taking: Reported on 7/8/2019)     No current facility-administered medications for this visit.      Allergies   Allergen Reactions     Sulfa Drugs        ROS:  CONSTITUTIONAL: NEGATIVE for fever, chills, change in weight  : NEGATIVE for unusual urinary or vaginal symptoms. Periods are regular.  PSYCHIATRIC: NEGATIVE for changes in mood or affect    OBJECTIVE:     /58 (BP Location: Right arm, Patient Position: Chair, Cuff Size: Adult Regular)   Wt 53 kg (116 lb 14.4 oz)   LMP 07/02/2019 (Exact Date)   Breastfeeding? No   BMI 22.09 kg/m    Body mass index is 22.09 kg/m .    PHYSICAL EXAM:  Constitutional:  Appearance: Well nourished, well developed alert, in no acute distress  Chest:  Respiratory Effort:  Breathing unlabored  Neurologic/Psychiatric:  Mental Status:  Oriented X3   Pelvic Exam:  External Genitalia:     Normal appearance for age, no discharge present, no tenderness present, no inflammatory lesions present, color normal  Vagina:     Normal vaginal vault without central or paravaginal defects, no discharge present, no inflammatory lesions present, no masses present, no evidence of retained tampon or partial tampon in vaginal canal or fornix of cervix   Bladder:     Nontender to palpation  Urethra:   Urethral Body:  Urethra palpation normal, urethra structural support normal   Urethral Meatus:  No erythema or lesions present  Cervix:     Appearance healthy, no lesions present, nontender to palpation, no bleeding present  Uterus:     Deferred   Perineum:     Perineum within normal limits, no evidence of trauma, no rashes or skin lesions present  Genitalia and Groin:     No rashes present, no lesions present, no areas of discoloration, no masses present       In-Clinic Test Results:  No results found for this or any previous visit (from the past 24 hour(s)).    ASSESSMENT/PLAN:                                                        ICD-10-CM    1. Encounter for gynecological examination without  abnormal finding Z01.419        PLAN:  -No evidence of retained tampon on pelvic exam or speculum exam   -return to clinic for well woman exam     Mary Grace Ruiz, DNP, APRN, CNM, IBCLC

## 2019-12-16 DIAGNOSIS — Z01.419 WELL WOMAN EXAM WITH ROUTINE GYNECOLOGICAL EXAM: ICD-10-CM

## 2019-12-16 DIAGNOSIS — Z30.41 SURVEILLANCE FOR BIRTH CONTROL, ORAL CONTRACEPTIVES: ICD-10-CM

## 2019-12-17 RX ORDER — NORGESTIMATE AND ETHINYL ESTRADIOL 7DAYSX3 28
1 KIT ORAL DAILY
Qty: 84 TABLET | Refills: 0 | Status: SHIPPED | OUTPATIENT
Start: 2019-12-17 | End: 2020-01-03

## 2020-01-03 ENCOUNTER — OFFICE VISIT (OUTPATIENT)
Dept: OBGYN | Facility: CLINIC | Age: 25
End: 2020-01-03
Payer: COMMERCIAL

## 2020-01-03 VITALS — SYSTOLIC BLOOD PRESSURE: 90 MMHG | BODY MASS INDEX: 21.99 KG/M2 | WEIGHT: 116.4 LBS | DIASTOLIC BLOOD PRESSURE: 60 MMHG

## 2020-01-03 DIAGNOSIS — Z12.4 CERVICAL CANCER SCREENING: ICD-10-CM

## 2020-01-03 DIAGNOSIS — Z30.41 SURVEILLANCE FOR BIRTH CONTROL, ORAL CONTRACEPTIVES: ICD-10-CM

## 2020-01-03 DIAGNOSIS — Z01.419 WELL WOMAN EXAM WITH ROUTINE GYNECOLOGICAL EXAM: Primary | ICD-10-CM

## 2020-01-03 DIAGNOSIS — Z11.3 ROUTINE SCREENING FOR STI (SEXUALLY TRANSMITTED INFECTION): ICD-10-CM

## 2020-01-03 PROCEDURE — 99395 PREV VISIT EST AGE 18-39: CPT | Performed by: ADVANCED PRACTICE MIDWIFE

## 2020-01-03 PROCEDURE — 87491 CHLMYD TRACH DNA AMP PROBE: CPT | Performed by: ADVANCED PRACTICE MIDWIFE

## 2020-01-03 PROCEDURE — G0145 SCR C/V CYTO,THINLAYER,RESCR: HCPCS | Performed by: ADVANCED PRACTICE MIDWIFE

## 2020-01-03 PROCEDURE — 87591 N.GONORRHOEAE DNA AMP PROB: CPT | Performed by: ADVANCED PRACTICE MIDWIFE

## 2020-01-03 RX ORDER — NORGESTIMATE AND ETHINYL ESTRADIOL 7DAYSX3 28
1 KIT ORAL DAILY
Qty: 84 TABLET | Refills: 3 | Status: SHIPPED | OUTPATIENT
Start: 2020-01-03 | End: 2021-01-08

## 2020-01-03 RX ORDER — NORGESTIMATE AND ETHINYL ESTRADIOL 7DAYSX3 28
1 KIT ORAL DAILY
Qty: 84 TABLET | Refills: 0 | Status: SHIPPED | OUTPATIENT
Start: 2020-01-03 | End: 2020-01-03

## 2020-01-03 NOTE — NURSING NOTE
"Chief Complaint   Patient presents with     Physical     Pap and G/C due   B/C refills        Initial BP 90/60 (BP Location: Left arm, Patient Position: Chair, Cuff Size: Adult Regular)   Wt 52.8 kg (116 lb 6.4 oz)   LMP 2019 (Approximate)   Breastfeeding No   BMI 21.99 kg/m   Estimated body mass index is 21.99 kg/m  as calculated from the following:    Height as of 18: 1.549 m (5' 1\").    Weight as of this encounter: 52.8 kg (116 lb 6.4 oz).  BP completed using cuff size: regular    Questioned patient about current smoking habits.  Pt. has never smoked.          The following HM Due: pap smear and G/C       Pt needs  A refill on OCP'S       Danica Ortiz CMA on 1/3/2020 at 9:40 AM         "

## 2020-01-03 NOTE — PATIENT INSTRUCTIONS
Birth Control Pills    Combination birth control pills contain both estrogen and progestin.  There are numerous brands of birth control pills otherwise known as oral contraceptive pills (OCP's).  Each brand has a different combination of estrogen and progestin so every woman can find the one that is right for her.  OCP's are a safe and effective way to prevent pregnancy in most women.    How do OCP's work  OCP's work by several different mechanisms.  They cause changes in the cervix and the lining of the uterus.  The cervical mucus becomes thicker which will prevent the sperm from entering the cervix.  The lining of the uterus becomes thin which helps prevent an egg from attaching to it.  In combination, these events make it unlikely that you will get pregnant. It may also prevent ovulation completely.    Benefits of OCP's  May reduce your risk of:  Cancer of the uterus and ovary, ovarian cysts, pelvic infection, bone loss, benign breast disease, anemia, ectopic pregnancy and acne.  It may also decrease symptoms of PCOS (Polycystic Ovarian Syndrome). OCP's may also improve cramping during menstrual cycle and may make you cycle shorter and lighter.    How to take OCP's  You have several choices on how to start taking your OCP's:    You can start the pill on the first day of your next period    You can start the pill on the Sunday after your next period starts    You can start the pill on the first day it was prescribed no matter where you are in your cycle.  In this case, you will need to make sure you are not pregnant.    No matter when you start your first pack, you will always start your next pack on the same day you started your first pack.    You should take the pill at the same time every day.  Do not skip any pills.  If you miss any pills, are taking antibiotics or vomit, use a backup method of birth control until you get your next period.    Pills come in packs of 21, 28 or 91 pills:      21 Pills:  Take one  pill at the same time every day for 21 days.  Wait 7 days before beginning your next pack.  During these 7 days you will have your period.    28 Pills:  Take one pill at the same time every day for 28 days.  The last 7 pills in the pack do not contain estrogen/progestin.  During these 7 days you will have your period.      91 Pills:  Take one pill at the same time every day for 91 days.  The last 7 pills in the pack do not contain estrogen/progestin.  During these 7 days you will have your period.  With this method you will only have 4 periods a year.  Some women eventually have no bleeding at all.    Each pill pack comes with instructions.  Please make sure you read them and understand these instructions.      What to do if you miss a pill    Occasionally you may forget to take a pill or not take it on time.  Take the missed pill as soon as you remember.  Take the next pill at the regular time.  It is ok if you take two pills in one day.  You may feel a bit queasy or have some spotting, this is normal and should not be concerning.  If you have missed more than one pill use a back up method of birth control and call the clinic for instructions on how to proceed.    Who should not take Combined OCP's    If you have a history or have blood clots    A history of cerebral vascular accident (stroke)    If you have ischemic heart or coronary artery disease    Known of suspected breast cancer    Known or suspected pregnancy    Smoker and over age 35    Any know liver abnormality    Migraine headaches with an aura    Undiagnosed abnormal vaginal bleeding    High blood pressure    Common side effects when starting OCP's  Headache, nausea, dizziness, breakthrough bleeding, missed periods, tender breasts, depression and anxiety.  Most side effects are minor and resolve in the first few months. Take the pill with meals or at bedtime if nausea occurs.    Call or return for care in the following circumstances:      Unexpected  missed periods or very heavy bleeding    Persistent vaginal bleeding    Depression    Suspected pregnancy    Persistent side effects such as:  Nausea, irregular menses or mood changes.    Seek emergency care immediately for the following:  ACHES    Abdominal or pelvic pain    Chest pain    Severe headache     Visual disturbances    Severe leg pain or numbness or tingling of extremities    Lastly-    Use of a backup method is recommended for the first cycle    Condoms are recommended to protect against STI's    OCP's are 99% effective if take correctly.    The pill helps to keep your periods regular, lighter and shorter and reduces cramps.  If you desire a pregnancy, you may stop taking your OCPs.     Please call the clinic with questions and concerns  [facility]  [phone]

## 2020-01-03 NOTE — PROGRESS NOTES
Ella is a 24 year old  female who presents for annual exam.     Besides routine health maintenance, she has no other health concerns today .    HPI:   Here for annual exam. Desires birth control refill. No other health concerns. Desires STI screening. No side effects with birth control. Due for Pap.       GYNECOLOGIC HISTORY:    No LMP recorded.    Regular menses? yes  Menses every 28 days.  Length of menses: 7 days    Her current contraception method is: oral contraceptives.  She  reports that she has never smoked. She has never used smokeless tobacco.    Patient is sexually active.  STD testing offered?  Accepted   Last PHQ-9 score on record = No flowsheet data found.  Last GAD7 score on record = No flowsheet data found.  Alcohol Score = yes     HEALTH MAINTENANCE:  Cholesterol: (No results found for: CHOL   Last Mammo: Not applicable,   Pap: (  Lab Results   Component Value Date    PAP NIL 2017    )    Colonoscopy:  NA  Dexa:  NA     Health maintenance updated:  yes    HISTORY:  OB History    Para Term  AB Living   0 0 0 0 0 0   SAB TAB Ectopic Multiple Live Births   0 0 0 0 0       Patient Active Problem List   Diagnosis     CARDIOVASCULAR SCREENING; LDL GOAL LESS THAN 160     Surveillance for birth control, oral contraceptives     History of chlamydia infection     Past Surgical History:   Procedure Laterality Date     DENTAL SURGERY      wisdom teeth, uncomplicated      Social History     Tobacco Use     Smoking status: Never Smoker     Smokeless tobacco: Never Used   Substance Use Topics     Alcohol use: Yes     Alcohol/week: 0.0 standard drinks      Problem (# of Occurrences) Relation (Name,Age of Onset)    Colon Cancer (1) Maternal Grandfather (Justin Moore)    Hyperlipidemia (1) Mother (Enedina Cavazos)    Hypertension (1) Mother (Enedina Cavazos)            Current Outpatient Medications   Medication Sig     fluconazole (DIFLUCAN) 150 MG tablet Take 1 tablet (150 mg) by mouth  every 7 days (Patient not taking: Reported on 7/8/2019)     norgestim-eth estrad triphasic (TRINESSA, 28,) 0.18/0.215/0.25 MG-35 MCG tablet Take 1 tablet by mouth daily     No current facility-administered medications for this visit.      Allergies   Allergen Reactions     Sulfa Drugs        Past medical, surgical, social and family histories were reviewed and updated in EPIC.    ROS:   12 point review of systems negative other than symptoms noted below or in the HPI.  No urinary frequency or dysuria, bladder or kidney problems    EXAM:  There were no vitals taken for this visit.   BMI: There is no height or weight on file to calculate BMI.    PHYSICAL EXAM:  Constitutional:   Appearance: Well nourished, well developed, alert, in no acute distress  Neck:  Lymph Nodes:  No lymphadenopathy present    Thyroid:  Gland size normal, nontender, no nodules or masses present  on palpation  Chest:  Respiratory Effort:  Breathing unlabored  Cardiovascular:    Heart: Auscultation:  Regular rate, normal rhythm, no murmurs present  Breasts: Inspection of Breasts:  No lymphadenopathy present., Palpation of Breasts and Axillae:  No masses present on palpation, no breast tenderness. and Axillary Lymph Nodes:  No lymphadenopathy present.  Gastrointestinal:   Abdominal Examination:  Abdomen nontender to palpation, tone normal without rigidity or guarding, no masses present, umbilicus without lesions   Liver and Spleen:  No hepatomegaly present, liver nontender to palpation    Hernias:  No hernias present  Lymphatic: Lymph Nodes:  No other lymphadenopathy present  Skin:  General Inspection:  No rashes present, no lesions present, no areas of  discoloration  Neurologic:    Mental Status:  Oriented X3.  Normal strength and tone, sensory exam                grossly normal, mentation intact and speech normal.    Psychiatric:   Mentation appears normal and affect normal/bright.         Pelvic Exam:  External Genitalia:     Normal appearance  for age, no discharge present, no tenderness present, no inflammatory lesions present, color normal  Vagina:     Normal vaginal vault without central or paravaginal defects, no discharge present, no inflammatory lesions present, no masses present  Bladder:     Nontender to palpation  Urethra:   Urethral Body:  Urethra palpation normal, urethra structural support normal   Urethral Meatus:  No erythema or lesions present  Cervix:     Appearance healthy, no lesions present, nontender to palpation, no bleeding present  Uterus:     Uterus: firm, normal sized and nontender, anteverted in position.   Adnexa:     No adnexal tenderness present, no adnexal masses present  Perineum:     Perineum within normal limits, no evidence of trauma, no rashes or skin lesions present  Anus:     Anus within normal limits, no hemorrhoids present  Inguinal Lymph Nodes:     No lymphadenopathy present  Pubic Hair:     Normal pubic hair distribution for age  Genitalia and Groin:     No rashes present, no lesions present, no areas of discoloration, no masses present      COUNSELING:   Reviewed preventive health counseling, as reflected in patient instructions       Contraception      ASSESSMENT:  24 year old female with satisfactory annual exam.    PLAN:  (Z01.419) Well woman exam with routine gynecological exam  (primary encounter diagnosis)  Comment: ordered   Plan: Pap imaged thin layer screen reflex to HPV if         ASCUS - recommended age 25 - 29 years,         norgestim-eth estrad triphasic (TRINESSA, 28,)         0.18/0.215/0.25 MG-35 MCG tablet, DISCONTINUED:        norgestim-eth estrad triphasic (TRINESSA, 28,)         0.18/0.215/0.25 MG-35 MCG tablet        ACHES reviewed handout provided     (Z12.4) Cervical cancer screening  Comment: ordered   Plan: Pap imaged thin layer screen reflex to HPV if         ASCUS - recommended age 25 - 29 years        Results pending     (Z30.41) Surveillance for birth control, oral  contraceptives  Comment: ordered   Plan: norgestim-eth estrad triphasic (TRINESSA, 28,)         0.18/0.215/0.25 MG-35 MCG tablet, DISCONTINUED:        norgestim-eth estrad triphasic (TRINESSA, 28,)         0.18/0.215/0.25 MG-35 MCG tablet            (Z11.3) Routine screening for STI (sexually transmitted infection)  Comment: ordered   Plan: Chlamydia trachomatis PCR, Neisseria         gonorrhoeae PCR        Results pending         EFRAIN Dunne CNM

## 2020-01-07 LAB
COPATH REPORT: NORMAL
PAP: NORMAL

## 2020-03-10 ENCOUNTER — HEALTH MAINTENANCE LETTER (OUTPATIENT)
Age: 25
End: 2020-03-10

## 2020-07-22 ENCOUNTER — VIRTUAL VISIT (OUTPATIENT)
Dept: FAMILY MEDICINE | Facility: OTHER | Age: 25
End: 2020-07-22

## 2020-07-22 DIAGNOSIS — Z20.822 SUSPECTED COVID-19 VIRUS INFECTION: Primary | ICD-10-CM

## 2020-07-22 DIAGNOSIS — Z20.822 SUSPECTED COVID-19 VIRUS INFECTION: ICD-10-CM

## 2020-07-22 PROCEDURE — U0003 INFECTIOUS AGENT DETECTION BY NUCLEIC ACID (DNA OR RNA); SEVERE ACUTE RESPIRATORY SYNDROME CORONAVIRUS 2 (SARS-COV-2) (CORONAVIRUS DISEASE [COVID-19]), AMPLIFIED PROBE TECHNIQUE, MAKING USE OF HIGH THROUGHPUT TECHNOLOGIES AS DESCRIBED BY CMS-2020-01-R: HCPCS | Performed by: FAMILY MEDICINE

## 2020-07-22 NOTE — LETTER
July 24, 2020        Ella Cavazos  [address] [place] [address]    COVID-19 Virus PCR to [facility] - Result   Date Value Ref Range Status   07/22/2020 Not Detected  Final     Comment:     Collection of multiple specimens from the same patient may be necessary to   detect the virus. The possibility of a false negative should be considered if   the patient's recent exposure or clinical presentation suggests 2019 nCOV   infection and diagnostic tests for other causes of illness are negative.   Repeat testing may be considered in this setting.  Viral RNA was extracted via a validated method and subsequently underwent   single step reverse transcriptase-real time polymerase chain reaction using   primers to the CDC specified N1,N2 gene targets of CoV2 and human RNP as an   internal control.  A negative result does not rule out the presence of real-time PCR inhibitors   in the specimen or COVID-19 RNA in concentrations below the limit of detection   of the assay. The possibility of a false negative should be considered if the   patients recent exposure or clinical presentation suggests COVID-19.   Additional testing or repeat testing requires consultation with the   laboratory.  Nasopharyngeal specimen is the preferred choice for swab-based SARS CoV2   testing. When collection of a nasopharyngeal swab is not possible the   following are acceptable alternatives:  an oropharyngeal (OP) specimen collected by a healthcare professional, or a   nasal mid-turbinate (NMT) swab collected by a healthcare professional or by   onsite self-collection (using a flocked tapered swab), or an anterior nares   specimen collected by a healthcare professional or by onsite self-collection   (using a round foam swab). (Centers for Disease Control)  Testing performed by [facility], Room [address]210, [address] [place] [id number]. This test was developed and its   performance characteristics  determined by the [facility]. It has not been cleared or approved by the FDA.  The laboratory is regulated under the Clinical Laboratory Improvement   Amendments of 1988 (CLIA-88) as qualified to perform high-complexity testing.   This test is used for clinical purposes. It should not be regarded as   investigational or for research.         No results found for: SARSCOVRES    This letter provides a written record that you were tested for COVID-19.      Your result was negative. This means that we didn t find the virus that causes COVID-19 in your sample. A test may show negative when you do actually have the virus. This can happen when the virus is in the early stages of infection, before you feel illness symptoms.    If you have symptoms   Stay home and away from others (self-isolate) until you meet ALL of the guidelines below:    You ve had no fever--and no medicine that reduces fever--for 3 full days (72 hours). And      Your other symptoms have gotten better. For example, your cough or breathing has improved. And     At least 10 days have passed since your symptoms started.    During this time:    Stay home. Don t go to work, school or anywhere else.     Stay in your own room, including for meals. Use your own bathroom if you can.    Stay away from others in your home. No hugging, kissing or shaking hands. No visitors.    Clean  high touch  surfaces often (doorknobs, counters, handles, etc.). Use a household cleaning spray or wipes. You can find a full list on the EPA website at www.epa.gov/pesticide-registration/list-n-disinfectants-use-against-sars-cov-2.    Cover your mouth and nose with a mask, tissue or washcloth to avoid spreading germs.    Wash your hands and face often with soap and water.    Going back to work  Check with your employer for any guidelines to follow for going back to work.    Employers: This document serves as formal notice that your employee tested  negative for COVID-19, as of the testing date shown above.

## 2020-07-22 NOTE — PROGRESS NOTES
"Date: 2020 09:38:11  Clinician: Rui Story  Clinician NPI: [id number]  Patient: Ella Cavazos  Patient : 1995  Patient Address: [address] [place] [address] [address], [place] [address]  Patient Phone: [phone]  Visit Protocol: URI  Patient Summary:  Ella is a 25 year old ( : 1995 ) female who initiated a Visit for COVID-19 (Coronavirus) evaluation and screening. When asked the question \"Please sign me up to receive news, health information and promotions from [employer].\", Ella responded \"No\".    Ella states her symptoms started suddenly 3-4 days ago. After her symptoms started, they improved and then got worse again.   Her symptoms consist of nausea, diarrhea, myalgia, and chills.   Ella denies having wheezing, teeth pain, ageusia, sore throat, anosmia, facial pain or pressure, fever, cough, nasal congestion, vomiting, rhinitis, malaise, ear pain, and headache. She also denies having recent facial or sinus surgery in the past 60 days and taking antibiotic medication in the past month. She is not experiencing dyspnea.   Precipitating events  She has not recently been exposed to someone with influenza. Ella has been in close contact with the following high risk individuals: adults 65 or older.   Pertinent COVID-19 (Coronavirus) information  In the past 14 days, Ella has not worked in a congregate living setting.   She does not work or volunteer as healthcare worker or a  and does not work or volunteer in a healthcare facility.   Ella also has not lived in a congregate living setting in the past 14 days. She does not live with a healthcare worker.   Ella has not had a close contact with a laboratory-confirmed COVID-19 patient within 14 days of symptom onset.   Pertinent medical history  Ella does not get yeast infections when she takes antibiotics.   Ella needs a return to work/school note.   Weight: 115 lbs   Ella does not smoke or use smokeless tobacco.   She " denies pregnancy and denies breastfeeding. She has menstruated in the past month.   Additional information as reported by the patient (free text): I have had off and on bad stomach pain, diarrhea, and loss of appetite since Sunday which is very unusual for me.   Weight: 115 lbs    MEDICATIONS: Tri-Sprintec (28) oral, ALLERGIES: Sulfa (Sulfonamide Antibiotics)  Clinician Response:  Dear Ella,   Your symptoms show that you may have coronavirus (COVID-19). This illness can cause fever, cough and trouble breathing. Many people get a mild case and get better on their own. Some people can get very sick.  What should I do?  We would like to test you for this virus.   1. Please call [phone] to schedule your visit. Explain that you were referred by [facility] to have a COVID-19 test. Be ready to share your [facility] visit ID number.  The following will serve as your written order for this COVID Test, ordered by me, for the indication of suspected COVID [[id number]]: The test will be ordered in [employer], our electronic health record, after you are scheduled. It will show as ordered and authorized by Dave Diaz MD.  Order: COVID-19 (Coronavirus) PCR for SYMPTOMATIC testing from [facility].      2. When it's time for your COVID test:  Stay at least 6 feet away from others. (If someone will drive you to your test, stay in the backseat, as far away from the  as you can.)   Cover your mouth and nose with a mask, tissue or washcloth.  Go straight to the testing site. Don't make any stops on the way there or back.      3.Starting now: Stay home and away from others (self-isolate) until:   You've had no fever---and no medicine that reduces fever---for 3 full days (72 hours). And...   Your other symptoms have gotten better. For example, your cough or breathing has improved. And...   At least 10 days have passed since your symptoms started.       During this time, don't leave the house except for testing or medical care.   Stay in your  "own room, even for meals. Use your own bathroom if you can.   Stay away from others in your home. No hugging, kissing or shaking hands. No visitors.  Don't go to work, school or anywhere else.    Clean \"high touch\" surfaces often (doorknobs, counters, handles, etc.). Use a household cleaning spray or wipes. You'll find a full list of  on the EPA website: www.epa.gov/pesticide-registration/list-n-disinfectants-use-against-sars-cov-2.   Cover your mouth and nose with a mask, tissue or washcloth to avoid spreading germs.  Wash your hands and face often. Use soap and water.  Caregivers in these groups are at risk for severe illness due to COVID-19:  o People 65 years and older  o People who live in a nursing home or long-term care facility  o People with chronic disease (lung, heart, cancer, diabetes, kidney, liver, immunologic)  o People who have a weakened immune system, including those who:   Are in cancer treatment  Take medicine that weakens the immune system, such as corticosteroids  Had a bone marrow or organ transplant  Have an immune deficiency  Have poorly controlled HIV or AIDS  Are obese (body mass index of 40 or higher)  Smoke regularly   o Caregivers should wear gloves while washing dishes, handling laundry and cleaning bedrooms and bathrooms.  o Use caution when washing and drying laundry: Don't shake dirty laundry, and use the warmest water setting that you can.  o For more tips, go to www.cdc.gov/coronavirus/2019-ncov/downloads/10Things.pdf.    4.Sign up for [employer]. We know it's scary to hear that you might have COVID-19. We want to track your symptoms to make sure you're okay over the next 2 weeks. Please look for an email from [employer]---this is a free, online program that we'll use to keep in touch. To sign up, follow the link in the email. Learn more at [email or web address]  How can I take care of myself?   Get lots of rest. Drink extra fluids (unless a doctor has told " you not to).   Take Tylenol (acetaminophen) for fever or pain. If you have liver or kidney problems, ask your family doctor if it's okay to take Tylenol.   Adults can take either:    650 mg (two 325 mg pills) every 4 to 6 hours, or...   1,000 mg (two 500 mg pills) every 8 hours as needed.    Note: Don't take more than 3,000 mg in one day. Acetaminophen is found in many medicines (both prescribed and over-the-counter medicines). Read all labels to be sure you don't take too much.   For children, check the Tylenol bottle for the right dose. The dose is based on the child's age or weight.    If you have other health problems (like cancer, heart failure, an organ transplant or severe kidney disease): Call your specialty clinic if you don't feel better in the next 2 days.       Know when to call 911. Emergency warning signs include:    Trouble breathing or shortness of breath Pain or pressure in the chest that doesn't go away Feeling confused like you haven't felt before, or not being able to wake up Bluish-colored lips or face.  Where can I get more information?   [facility] -- About COVID-19: [email or web address]   CDC -- What to Do If You're Sick: www.cdc.gov/coronavirus/2019-ncov/about/steps-when-sick.html   CDC -- Ending Home Isolation: www.cdc.gov/coronavirus/2019-ncov/hcp/disposition-in-home-patients.html   CDC -- Caring for Someone: www.cdc.gov/coronavirus/2019-ncov/if-you-are-sick/care-for-someone.html   [facility] -- Interim Guidance for Hospital Discharge to Home: www.health.[facility].[place]/diseases/coronavirus/hcp/hospdischarge.pdf   [facility] clinical trials (COVID-19 research studies): clinicalaffairs.[facility]/umn-clinical-trials    Below are the COVID-19 hotlines at the [place] Department of Health ([facility]). Interpreters are available.    For health questions: Call 702-655-5707 or 1-889.706.8529 (7 a.m. to 7 p.m.) For questions about schools and childcare: Call 668-384-5397 or  [phone] (7 a.m. to 7 p.m.)    Diagnosis: Myalgia, unspecified site  Diagnosis ICD: M79.10

## 2020-07-23 LAB
SARS-COV-2 RNA SPEC QL NAA+PROBE: NOT DETECTED
SPECIMEN SOURCE: NORMAL

## 2020-12-27 ENCOUNTER — HEALTH MAINTENANCE LETTER (OUTPATIENT)
Age: 25
End: 2020-12-27

## 2021-01-08 ENCOUNTER — VIRTUAL VISIT (OUTPATIENT)
Dept: FAMILY MEDICINE | Facility: CLINIC | Age: 26
End: 2021-01-08
Payer: COMMERCIAL

## 2021-01-08 DIAGNOSIS — Z30.41 SURVEILLANCE FOR BIRTH CONTROL, ORAL CONTRACEPTIVES: ICD-10-CM

## 2021-01-08 PROCEDURE — 99202 OFFICE O/P NEW SF 15 MIN: CPT | Mod: 95 | Performed by: NURSE PRACTITIONER

## 2021-01-08 RX ORDER — NORGESTIMATE AND ETHINYL ESTRADIOL 7DAYSX3 28
1 KIT ORAL DAILY
Qty: 84 TABLET | Refills: 3 | Status: SHIPPED | OUTPATIENT
Start: 2021-01-08 | End: 2021-11-12

## 2021-01-08 NOTE — PROGRESS NOTES
Ella is a 25 year old who is being evaluated via a billable video visit.      How would you like to obtain your AVS? MyChart  If the video visit is dropped, the invitation should be resent by: Text to cell phone: [phone]  Will anyone else be joining your video visit? No    Video Start Time: 10:17 AM  Assessment & Plan     Surveillance for birth control, oral contraceptives  No contraindications.  Refilled.  - norgestim-eth estrad triphasic (TRINESSA, 28,) 0.18/0.215/0.25 MG-35 MCG tablet; Take 1 tablet by mouth daily             No follow-ups on file.    Shirin Hawkins, EFRAIN CNP  [facility]    Subjective     Ella is a 25 year old who presents to clinic today for the following health issues     HPI       Medication Followup of norgestim-eth estrad triphasic (TRINESSA, 28,) 0.18/0.215/0.25 MG-35 MCG tablet    Taking Medication as prescribed: yes    Side Effects:  None    Medication Helping Symptoms:  yes     Refill OCP.  Normal frequency periods.  No issues with compliance.  No hx of blood clots, smoking, migraines, htn.  Does not desire a change.    Review of Systems   Constitutional, HEENT, cardiovascular, pulmonary, gi and gu systems are negative, except as otherwise noted.      Objective    Vitals - Patient Reported  Weight (Patient Reported): 54.4 kg (120 lb)  Height (Patient Reported): 152.4 cm (5')  BMI (Based on Pt Reported Ht/Wt): 23.44  Pulse (Patient Reported): 62      Vitals:  No vitals were obtained today due to virtual visit.    Physical Exam   GENERAL: Healthy, alert and no distress  EYES: Eyes grossly normal to inspection.  No discharge or erythema, or obvious scleral/conjunctival abnormalities.  RESP: No audible wheeze, cough, or visible cyanosis.  No visible retractions or increased work of breathing.    SKIN: Visible skin clear. No significant rash, abnormal pigmentation or lesions.  NEURO: Cranial nerves grossly intact.  Mentation and speech appropriate for  age.  PSYCH: Mentation appears normal, affect normal/bright, judgement and insight intact, normal speech and appearance well-groomed.                Video-Visit Details    Type of service:  Video Visit    Video End Time:10:22am    Originating Location (pt. Location): Home    [facility]     Platform used for Video Visit: Juancarlos

## 2021-02-28 ENCOUNTER — HEALTH MAINTENANCE LETTER (OUTPATIENT)
Age: 26
End: 2021-02-28

## 2021-10-04 ENCOUNTER — HEALTH MAINTENANCE LETTER (OUTPATIENT)
Age: 26
End: 2021-10-04

## 2021-11-09 SDOH — ECONOMIC STABILITY: FOOD INSECURITY: WITHIN THE PAST 12 MONTHS, YOU WORRIED THAT YOUR FOOD WOULD RUN OUT BEFORE YOU GOT MONEY TO BUY MORE.: NEVER TRUE

## 2021-11-09 SDOH — ECONOMIC STABILITY: TRANSPORTATION INSECURITY
IN THE PAST 12 MONTHS, HAS THE LACK OF TRANSPORTATION KEPT YOU FROM MEDICAL APPOINTMENTS OR FROM GETTING MEDICATIONS?: NO

## 2021-11-09 SDOH — HEALTH STABILITY: PHYSICAL HEALTH: ON AVERAGE, HOW MANY MINUTES DO YOU ENGAGE IN EXERCISE AT THIS LEVEL?: 30 MIN

## 2021-11-09 SDOH — ECONOMIC STABILITY: FOOD INSECURITY: WITHIN THE PAST 12 MONTHS, THE FOOD YOU BOUGHT JUST DIDN'T LAST AND YOU DIDN'T HAVE MONEY TO GET MORE.: NEVER TRUE

## 2021-11-09 SDOH — ECONOMIC STABILITY: INCOME INSECURITY: HOW HARD IS IT FOR YOU TO PAY FOR THE VERY BASICS LIKE FOOD, HOUSING, MEDICAL CARE, AND HEATING?: NOT HARD AT ALL

## 2021-11-09 SDOH — ECONOMIC STABILITY: TRANSPORTATION INSECURITY
IN THE PAST 12 MONTHS, HAS LACK OF TRANSPORTATION KEPT YOU FROM MEETINGS, WORK, OR FROM GETTING THINGS NEEDED FOR DAILY LIVING?: NO

## 2021-11-09 SDOH — ECONOMIC STABILITY: INCOME INSECURITY: IN THE LAST 12 MONTHS, WAS THERE A TIME WHEN YOU WERE NOT ABLE TO PAY THE MORTGAGE OR RENT ON TIME?: NO

## 2021-11-09 SDOH — HEALTH STABILITY: PHYSICAL HEALTH: ON AVERAGE, HOW MANY DAYS PER WEEK DO YOU ENGAGE IN MODERATE TO STRENUOUS EXERCISE (LIKE A BRISK WALK)?: 1 DAY

## 2021-11-09 ASSESSMENT — SOCIAL DETERMINANTS OF HEALTH (SDOH)
IN A TYPICAL WEEK, HOW MANY TIMES DO YOU TALK ON THE PHONE WITH FAMILY, FRIENDS, OR NEIGHBORS?: ONCE A WEEK
ARE YOU MARRIED, WIDOWED, DIVORCED, SEPARATED, NEVER MARRIED, OR LIVING WITH A PARTNER?: LIVING WITH PARTNER
DO YOU BELONG TO ANY CLUBS OR ORGANIZATIONS SUCH AS CHURCH GROUPS UNIONS, FRATERNAL OR ATHLETIC GROUPS, OR SCHOOL GROUPS?: YES
HOW OFTEN DO YOU ATTEND CHURCH OR RELIGIOUS SERVICES?: NEVER
HOW OFTEN DO YOU GET TOGETHER WITH FRIENDS OR RELATIVES?: TWICE A WEEK

## 2021-11-09 ASSESSMENT — ENCOUNTER SYMPTOMS
HEMATOCHEZIA: 0
SORE THROAT: 0
WEAKNESS: 0
FREQUENCY: 0
DIARRHEA: 0
MYALGIAS: 0
HEARTBURN: 0
HEMATURIA: 0
CONSTIPATION: 0
HEADACHES: 0
SHORTNESS OF BREATH: 0
COUGH: 0
CHILLS: 0
NERVOUS/ANXIOUS: 0
JOINT SWELLING: 0
FEVER: 0
DIZZINESS: 0
NAUSEA: 0
BREAST MASS: 0
DYSURIA: 0
PARESTHESIAS: 0
EYE PAIN: 0
ABDOMINAL PAIN: 0
PALPITATIONS: 0
ARTHRALGIAS: 0

## 2021-11-09 ASSESSMENT — LIFESTYLE VARIABLES
HOW MANY STANDARD DRINKS CONTAINING ALCOHOL DO YOU HAVE ON A TYPICAL DAY: 3 OR 4
HOW OFTEN DO YOU HAVE SIX OR MORE DRINKS ON ONE OCCASION: LESS THAN MONTHLY
HOW OFTEN DO YOU HAVE A DRINK CONTAINING ALCOHOL: 2-3 TIMES A WEEK

## 2021-11-12 ENCOUNTER — OFFICE VISIT (OUTPATIENT)
Dept: FAMILY MEDICINE | Facility: CLINIC | Age: 26
End: 2021-11-12
Payer: COMMERCIAL

## 2021-11-12 VITALS
RESPIRATION RATE: 15 BRPM | TEMPERATURE: 98.6 F | HEIGHT: 62 IN | OXYGEN SATURATION: 99 % | SYSTOLIC BLOOD PRESSURE: 104 MMHG | BODY MASS INDEX: 22.26 KG/M2 | HEART RATE: 61 BPM | DIASTOLIC BLOOD PRESSURE: 54 MMHG | WEIGHT: 121 LBS

## 2021-11-12 DIAGNOSIS — Z30.41 SURVEILLANCE FOR BIRTH CONTROL, ORAL CONTRACEPTIVES: ICD-10-CM

## 2021-11-12 DIAGNOSIS — Z00.00 ROUTINE GENERAL MEDICAL EXAMINATION AT A HEALTH CARE FACILITY: Primary | ICD-10-CM

## 2021-11-12 PROCEDURE — 99395 PREV VISIT EST AGE 18-39: CPT | Mod: 25 | Performed by: FAMILY MEDICINE

## 2021-11-12 PROCEDURE — 90471 IMMUNIZATION ADMIN: CPT | Performed by: FAMILY MEDICINE

## 2021-11-12 PROCEDURE — 90715 TDAP VACCINE 7 YRS/> IM: CPT | Performed by: FAMILY MEDICINE

## 2021-11-12 RX ORDER — NORGESTIMATE AND ETHINYL ESTRADIOL 7DAYSX3 28
1 KIT ORAL DAILY
Qty: 84 TABLET | Refills: 3 | Status: SHIPPED | OUTPATIENT
Start: 2021-11-12 | End: 2022-11-07

## 2021-11-12 ASSESSMENT — ENCOUNTER SYMPTOMS
CONSTIPATION: 0
SHORTNESS OF BREATH: 0
JOINT SWELLING: 0
DIARRHEA: 0
PALPITATIONS: 0
COUGH: 0
HEMATOCHEZIA: 0
FREQUENCY: 0
HEADACHES: 0
FEVER: 0
ABDOMINAL PAIN: 0
BREAST MASS: 0
DYSURIA: 0
WEAKNESS: 0
EYE PAIN: 0
NAUSEA: 0
HEARTBURN: 0
SORE THROAT: 0
NERVOUS/ANXIOUS: 0
MYALGIAS: 0
ARTHRALGIAS: 0
DIZZINESS: 0
CHILLS: 0
HEMATURIA: 0
PARESTHESIAS: 0

## 2021-11-12 ASSESSMENT — MIFFLIN-ST. JEOR: SCORE: 1232.1

## 2021-11-12 NOTE — NURSING NOTE
Prior to immunization administration, verified patients identity using patient s name and date of birth. Please see Immunization Activity for additional information.     Screening Questionnaire for Adult Immunization    Are you sick today?   No   Do you have allergies to medications, food, a vaccine component or latex?   Yes   Have you ever had a serious reaction after receiving a vaccination?   No   Do you have a long-term health problem with heart, lung, kidney, or metabolic disease (e.g., diabetes), asthma, a blood disorder, no spleen, complement component deficiency, a cochlear implant, or a spinal fluid leak?  Are you on long-term aspirin therapy?   No   Do you have cancer, leukemia, HIV/AIDS, or any other immune system problem?   No   Do you have a parent, brother, or sister with an immune system problem?   No   In the past 3 months, have you taken medications that affect  your immune system, such as prednisone, other steroids, or anticancer drugs; drugs for the treatment of rheumatoid arthritis, Crohn s disease, or psoriasis; or have you had radiation treatments?   No   Have you had a seizure, or a brain or other nervous system problem?   No   During the past year, have you received a transfusion of blood or blood    products, or been given immune (gamma) globulin or antiviral drug?   No   For women: Are you pregnant or is there a chance you could become       pregnant during the next month?   No   Have you received any vaccinations in the past 4 weeks?   No     Immunization questionnaire was positive for at least one answer.  Notified Stewart.        Per orders of Dr. William, injection of  Tdap given by Jazmin Galan MA. Patient instructed to remain in clinic for 15 minutes afterwards, and to report any adverse reaction to me immediately.       Screening performed by Jazmin Galan MA on 11/12/2021 at 9:49 AM.

## 2021-11-12 NOTE — PROGRESS NOTES
SUBJECTIVE:   CC: Ella Cavazos is an 26 year old woman who presents for preventive health visit.       Patient has been advised of split billing requirements and indicates understanding: Yes  Healthy Habits:     Getting at least 3 servings of Calcium per day:  NO    Bi-annual eye exam:  Yes    Dental care twice a year:  Yes    Sleep apnea or symptoms of sleep apnea:  None    Diet:  Regular (no restrictions)    Frequency of exercise:  1 day/week    Duration of exercise:  15-30 minutes    Taking medications regularly:  Yes    Barriers to taking medications:  None    Medication side effects:  None    PHQ-2 Total Score: 2    Additional concerns today:  No    Medication Followup of norgestim-eth estrad triphasic (TRINESSA, 28,) 0.18/0.215/0.25 MG-35 MCG tablet    Taking Medication as prescribed: yes    Side Effects:  None    Medication Helping Symptoms:  yes      Moles on the neck to be check. Fair skin, popped up this summer.  No itching or bleeding.    And lymph nodes concerns. Always had swollen nodes in neck, recently sister in law is going to dental hygiene school and she and her professor noticed a lump under chin, and the ones always on neck.      Today's PHQ-2 Score:   PHQ-2 ( 1999 Pfizer) 11/9/2021   Q1: Little interest or pleasure in doing things 1   Q2: Feeling down, depressed or hopeless 1   PHQ-2 Score 2   Q1: Little interest or pleasure in doing things Several days   Q2: Feeling down, depressed or hopeless Several days   PHQ-2 Score 2       Abuse: Current or Past (Physical, Sexual or Emotional) - No  Do you feel safe in your environment? Yes    Have you ever done Advance Care Planning? (For example, a Health Directive, POLST, or a discussion with a medical provider or your loved ones about your wishes): No, advance care planning information given to patient to review.  Patient plans to discuss their wishes with loved ones or provider.      Social History     Tobacco Use     Smoking status: Never  Smoker     Smokeless tobacco: Never Used   Substance Use Topics     Alcohol use: Yes     Alcohol/week: 0.0 standard drinks     Comment: socially     If you drink alcohol do you typically have >3 drinks per day or >7 drinks per week? No    Alcohol Use 11/12/2021   Prescreen: >3 drinks/day or >7 drinks/week? -   Prescreen: >3 drinks/day or >7 drinks/week? No       Reviewed orders with patient.  Reviewed health maintenance and updated orders accordingly - Yes  BP Readings from Last 3 Encounters:   11/12/21 104/54   01/03/20 90/60   07/08/19 100/58    Wt Readings from Last 3 Encounters:   11/12/21 54.9 kg (121 lb)   01/03/20 52.8 kg (116 lb 6.4 oz)   07/08/19 53 kg (116 lb 14.4 oz)                    Breast Cancer Screening:    FHS-7:   Breast CA Risk Assessment (FHS-7) 11/9/2021   Did any of your first-degree relatives have breast or ovarian cancer? No   Did any of your relatives have bilateral breast cancer? No   Did any man in your family have breast cancer? No   Did any woman in your family have breast and ovarian cancer? No   Did any woman in your family have breast cancer before age 50 y? No   Do you have 2 or more relatives with breast and/or ovarian cancer? No   Do you have 2 or more relatives with breast and/or bowel cancer? No     click delete button to remove this line now  Patient under 40 years of age: Routine Mammogram Screening not recommended.   Pertinent mammograms are reviewed under the imaging tab.    History of abnormal Pap smear: NO - age 21-29 PAP every 3 years recommended  PAP / HPV 1/3/2020 2/3/2017   PAP (Historical) NIL NIL     Reviewed and updated as needed this visit by clinical staff  Tobacco  Allergies  Meds  Problems  Med Hx  Surg Hx  Fam Hx  Soc Hx         Reviewed and updated as needed this visit by Provider  Tobacco  Allergies  Meds  Problems  Med Hx  Surg Hx  Fam Hx            Review of Systems   Constitutional: Negative for chills and fever.   HENT: Negative for  "congestion, ear pain, hearing loss and sore throat.    Eyes: Negative for pain and visual disturbance.   Respiratory: Negative for cough and shortness of breath.    Cardiovascular: Negative for chest pain, palpitations and peripheral edema.   Gastrointestinal: Negative for abdominal pain, constipation, diarrhea, heartburn, hematochezia and nausea.   Breasts:  Negative for tenderness, breast mass and discharge.   Genitourinary: Negative for dysuria, frequency, genital sores, hematuria, pelvic pain, urgency, vaginal bleeding and vaginal discharge.   Musculoskeletal: Negative for arthralgias, joint swelling and myalgias.   Skin: Negative for rash.   Neurological: Negative for dizziness, weakness, headaches and paresthesias.   Psychiatric/Behavioral: Negative for mood changes. The patient is not nervous/anxious.           OBJECTIVE:   /54 (BP Location: Left arm, Patient Position: Chair, Cuff Size: Adult Regular)   Pulse 61   Temp 98.6  F (37  C) (Temporal)   Resp 15   Ht 1.575 m (5' 2\")   Wt 54.9 kg (121 lb)   LMP 10/18/2021 (Exact Date)   SpO2 99%   BMI 22.13 kg/m    Physical Exam  GENERAL: healthy, alert and no distress  EYES: Eyes grossly normal to inspection, PERRL and conjunctivae and sclerae normal  HENT: ear canals and TM's normal, nose and mouth without ulcers or lesions  NECK: no adenopathy, no asymmetry, masses, or scars and thyroid normal to palpation  RESP: lungs clear to auscultation - no rales, rhonchi or wheezes  CV: regular rate and rhythm, normal S1 S2, no S3 or S4, no murmur, click or rub, no peripheral edema and peripheral pulses strong  ABDOMEN: soft, nontender, no hepatosplenomegaly, no masses and bowel sounds normal  MS: no gross musculoskeletal defects noted, no edema  SKIN: no suspicious lesions or rashes  NEURO: Normal strength and tone, mentation intact and speech normal  PSYCH: mentation appears normal, affect normal/bright    Diagnostic Test Results:  Labs reviewed in " "Epic    ASSESSMENT/PLAN:   Ella was seen today for physical.    Diagnoses and all orders for this visit:    Routine general medical examination at a health care facility  Comments:  Pap due 2023  Flu done  Refilled OCP  Exercise, diet, healthy lifestyle discussed  vit D 1000 IU daily Oct-March    Surveillance for birth control, oral contraceptives  -     norgestim-eth estrad triphasic (TRINESSA, 28,) 0.18/0.215/0.25 MG-35 MCG tablet; Take 1 tablet by mouth daily    Other orders  -     REVIEW OF HEALTH MAINTENANCE PROTOCOL ORDERS  -     TDAP VACCINE (Adacel, Boostrix)  [[id number]]          Mole on left neck examined.  2 mm, discrete, well circumscribed, skin colored.  Looks benign.  Discussed what to watch for or be concerned about.    No abnormal masses or lymph nodes palpated on neck, discussed with patient what to watch for or when to return.    Discussed with patient, all questions answered, in agreement with this plan, will return or seek further care if not improving or worsening.    Patient has been advised of split billing requirements and indicates understanding: Yes  COUNSELING:  Reviewed preventive health counseling, as reflected in patient instructions    Estimated body mass index is 22.13 kg/m  as calculated from the following:    Height as of this encounter: 1.575 m (5' 2\").    Weight as of this encounter: 54.9 kg (121 lb).        She reports that she has never smoked. She has never used smokeless tobacco.      Counseling Resources:  ATP IV Guidelines  Pooled Cohorts Equation Calculator  Breast Cancer Risk Calculator  BRCA-Related Cancer Risk Assessment: FHS-7 Tool  FRAX Risk Assessment  ICSI Preventive Guidelines  Dietary Guidelines for Americans, 2010  USDA's MyPlate  ASA Prophylaxis  Lung CA Screening    Brittni William MD  [facility]      "

## 2022-09-11 ENCOUNTER — HEALTH MAINTENANCE LETTER (OUTPATIENT)
Age: 27
End: 2022-09-11

## 2022-11-05 DIAGNOSIS — Z30.41 SURVEILLANCE FOR BIRTH CONTROL, ORAL CONTRACEPTIVES: ICD-10-CM

## 2022-11-07 RX ORDER — NORGESTIMATE AND ETHINYL ESTRADIOL 7DAYSX3 28
KIT ORAL
Qty: 28 TABLET | Refills: 2 | Status: SHIPPED | OUTPATIENT
Start: 2022-11-07 | End: 2023-02-06

## 2022-11-07 NOTE — TELEPHONE ENCOUNTER
Medication is being filled for 1 time refill only due to:  Patient needs to be seen because it has been more than one year since last visit.     Team Coordinators: Please contact patient to set up annual physical for any further refills.     AMANDA Ivan RN  [facility]

## 2023-01-22 ENCOUNTER — HEALTH MAINTENANCE LETTER (OUTPATIENT)
Age: 28
End: 2023-01-22

## 2023-02-04 DIAGNOSIS — Z30.41 SURVEILLANCE FOR BIRTH CONTROL, ORAL CONTRACEPTIVES: ICD-10-CM

## 2023-02-06 RX ORDER — NORGESTIMATE AND ETHINYL ESTRADIOL 7DAYSX3 28
1 KIT ORAL DAILY
Qty: 28 TABLET | Refills: 0 | Status: SHIPPED | OUTPATIENT
Start: 2023-02-06 | End: 2023-03-08

## 2023-02-06 RX ORDER — NORGESTIMATE AND ETHINYL ESTRADIOL 7DAYSX3 28
KIT ORAL
Qty: 28 TABLET | Refills: 0 | OUTPATIENT
Start: 2023-02-06

## 2023-02-06 NOTE — TELEPHONE ENCOUNTER
Rx request refused as patient needs appointment.    Reception: Please call and schedule patient.    RNs: Okay to fill dana refill once scheduled.    Thank you,  SJ

## 2023-02-06 NOTE — TELEPHONE ENCOUNTER
Guerline dockery approved per Dr. William.    FELECIA Connors, BSN, RN-BC  [facility]

## 2023-02-06 NOTE — TELEPHONE ENCOUNTER
Routing refill request to provider for review/approval because:  -- Guerline given x1 and patient did schedule appointment   -- Patient needs to be seen because it has been more than 1 year since last office visit.    Last Written Prescription Date:  11/7/2022  Last Fill Quantity: 28,  # refills: 2   Last office visit: 11/12/2021 with prescribing provider:  Stewart   Future Office Visit:  3/17/2023 w/Dr. Stewart Law, BSN RN  [facility]

## 2023-03-05 DIAGNOSIS — Z30.41 SURVEILLANCE FOR BIRTH CONTROL, ORAL CONTRACEPTIVES: ICD-10-CM

## 2023-03-08 RX ORDER — NORGESTIMATE AND ETHINYL ESTRADIOL 7DAYSX3 28
KIT ORAL
Qty: 28 TABLET | Refills: 0 | Status: SHIPPED | OUTPATIENT
Start: 2023-03-08 | End: 2023-03-17

## 2023-03-08 NOTE — TELEPHONE ENCOUNTER
Routing refill request to provider for review/approval because:  --Last visit:  11/12/2021.    --Last two orders were dana and patient is now scheduled.    --Future Visit: 3/17/23 Stewart for annual preventive.            --Last Written Prescription:     Disp Refills Start End RICHELLE   norgestim-eth estrad triphasic (TRI-SPRINTEC) 0.18/0.215/0.25 MG-35 MCG tablet 28 tablet 0 2/6/2023  No   Sig - Route: Take 1 tablet by mouth daily - Oral   Sent to pharmacy as: Norgestim-Eth Estrad Triphasic 0.18/0.215/0.25 MG-35 MCG Oral Tablet (Tri-Sprintec)   Class: E-Prescribe   Notes to Pharmacy: ++VISIT NEEDED BEFORE FURTHER REFILLS++

## 2023-03-10 ASSESSMENT — ENCOUNTER SYMPTOMS
BREAST MASS: 0
NERVOUS/ANXIOUS: 0
SHORTNESS OF BREATH: 0
HEARTBURN: 0
HEMATURIA: 0
DIZZINESS: 0
FREQUENCY: 0
WEAKNESS: 0
ARTHRALGIAS: 0
PALPITATIONS: 0
HEADACHES: 0
COUGH: 0
FEVER: 0
PARESTHESIAS: 0
SORE THROAT: 0
MYALGIAS: 0
CHILLS: 0
EYE PAIN: 0
DIARRHEA: 0
ABDOMINAL PAIN: 0
HEMATOCHEZIA: 0
CONSTIPATION: 0
NAUSEA: 0
JOINT SWELLING: 0
DYSURIA: 0

## 2023-03-17 ENCOUNTER — OFFICE VISIT (OUTPATIENT)
Dept: FAMILY MEDICINE | Facility: CLINIC | Age: 28
End: 2023-03-17
Payer: COMMERCIAL

## 2023-03-17 VITALS
OXYGEN SATURATION: 100 % | HEART RATE: 80 BPM | TEMPERATURE: 97.9 F | DIASTOLIC BLOOD PRESSURE: 68 MMHG | HEIGHT: 60 IN | RESPIRATION RATE: 14 BRPM | WEIGHT: 124 LBS | SYSTOLIC BLOOD PRESSURE: 101 MMHG | BODY MASS INDEX: 24.35 KG/M2

## 2023-03-17 DIAGNOSIS — Z00.00 ROUTINE GENERAL MEDICAL EXAMINATION AT A HEALTH CARE FACILITY: ICD-10-CM

## 2023-03-17 DIAGNOSIS — Z30.41 SURVEILLANCE FOR BIRTH CONTROL, ORAL CONTRACEPTIVES: ICD-10-CM

## 2023-03-17 DIAGNOSIS — Z12.4 CERVICAL CANCER SCREENING: ICD-10-CM

## 2023-03-17 DIAGNOSIS — Z11.59 NEED FOR HEPATITIS C SCREENING TEST: ICD-10-CM

## 2023-03-17 DIAGNOSIS — Z11.4 SCREENING FOR HIV (HUMAN IMMUNODEFICIENCY VIRUS): ICD-10-CM

## 2023-03-17 PROCEDURE — 87624 HPV HI-RISK TYP POOLED RSLT: CPT | Performed by: FAMILY MEDICINE

## 2023-03-17 PROCEDURE — 99395 PREV VISIT EST AGE 18-39: CPT | Performed by: FAMILY MEDICINE

## 2023-03-17 PROCEDURE — G0145 SCR C/V CYTO,THINLAYER,RESCR: HCPCS | Performed by: FAMILY MEDICINE

## 2023-03-17 RX ORDER — NORGESTIMATE AND ETHINYL ESTRADIOL 7DAYSX3 28
1 KIT ORAL DAILY
Qty: 90 TABLET | Refills: 3 | Status: SHIPPED | OUTPATIENT
Start: 2023-03-17 | End: 2024-02-16

## 2023-03-17 ASSESSMENT — ENCOUNTER SYMPTOMS
JOINT SWELLING: 0
COUGH: 0
HEARTBURN: 0
ARTHRALGIAS: 0
SHORTNESS OF BREATH: 0
WEAKNESS: 0
HEMATURIA: 0
HEADACHES: 0
BREAST MASS: 0
DIZZINESS: 0
MYALGIAS: 0
DYSURIA: 0
EYE PAIN: 0
SORE THROAT: 0
CONSTIPATION: 0
ABDOMINAL PAIN: 0
FEVER: 0
HEMATOCHEZIA: 0
CHILLS: 0
PARESTHESIAS: 0
NERVOUS/ANXIOUS: 0
DIARRHEA: 0
NAUSEA: 0
FREQUENCY: 0
PALPITATIONS: 0

## 2023-03-17 ASSESSMENT — PAIN SCALES - GENERAL: PAINLEVEL: NO PAIN (0)

## 2023-03-17 NOTE — PROGRESS NOTES
SUBJECTIVE:   CC: Becky is an 28 year old who presents for preventive health visit.   Patient has been advised of split billing requirements and indicates understanding: Yes  Healthy Habits:     Getting at least 3 servings of Calcium per day:  NO    Bi-annual eye exam:  NO    Dental care twice a year:  Yes    Sleep apnea or symptoms of sleep apnea:  None    Diet:  Regular (no restrictions)    Frequency of exercise:  2-3 days/week    Duration of exercise:  15-30 minutes    Taking medications regularly:  Yes    Medication side effects:  None    PHQ-2 Total Score: 1    Additional concerns today:  No      Today's PHQ-2 Score:   PHQ-2 ( 1999 [employer]) 3/10/2023   Q1: Little interest or pleasure in doing things 1   Q2: Feeling down, depressed or hopeless 0   PHQ-2 Score 1   PHQ-2 Total Score (12-17 Years)- Positive if 3 or more points; Administer PHQ-A if positive -   Q1: Little interest or pleasure in doing things Several days   Q2: Feeling down, depressed or hopeless Not at all   PHQ-2 Score 1           Social History     Tobacco Use     Smoking status: Never     Smokeless tobacco: Never   Substance Use Topics     Alcohol use: Yes     Alcohol/week: 0.0 standard drinks     Comment: socially         Alcohol Use 3/17/2023   Prescreen: >3 drinks/day or >7 drinks/week? Yes   AUDIT SCORE  -     AUDIT - Alcohol Use Disorders Identification Test - Reproduced from the World Health Organization Audit 2001 (Second Edition) 3/17/2023   1.  How often do you have a drink containing alcohol? 2 to 3 times a week   2.  How many drinks containing alcohol do you have on a typical day when you are drinking? 1 or 2   3.  How often do you have five or more drinks on one occasion? Monthly   4.  How often during the last year have you found that you were not able to stop drinking once you had started? Never   5.  How often during the last year have you failed to do what was normally expected of you because of drinking? Never   6.  How often  during the last year have you needed a first drink in the morning to get yourself going after a heavy drinking session? Never   7.  How often during the last year have you had a feeling of guilt or remorse after drinking? Never   8.  How often during the last year have you been unable to remember what happened the night before because of your drinking? Never   9.  Have you or someone else been injured because of your drinking? No   10. Has a relative, friend, doctor or other health care worker been concerned about your drinking or suggested you cut down? -   TOTAL SCORE -       Reviewed orders with patient.  Reviewed health maintenance and updated orders accordingly - Yes      Breast Cancer Screening:    FHS-7:   Breast CA Risk Assessment (FHS-7) 11/9/2021 3/10/2023   Did any of your first-degree relatives have breast or ovarian cancer? No No   Did any of your relatives have bilateral breast cancer? No No   Did any man in your family have breast cancer? No No   Did any woman in your family have breast and ovarian cancer? No Unknown   Did any woman in your family have breast cancer before age 50 y? No No   Do you have 2 or more relatives with breast and/or ovarian cancer? No No   Do you have 2 or more relatives with breast and/or bowel cancer? No Unknown       Patient under 40 years of age: Routine Mammogram Screening not recommended.   Pertinent mammograms are reviewed under the imaging tab.    History of abnormal Pap smear: NO - age 30-65 PAP every 5 years with negative HPV co-testing recommended  PAP / HPV 1/3/2020 2/3/2017   PAP (Historical) NIL NIL     Reviewed and updated as needed this visit by clinical staff   Tobacco  Allergies  Meds  Problems  Med Hx  Surg Hx  Fam Hx          Reviewed and updated as needed this visit by Provider   Tobacco  Allergies  Meds  Problems  Med Hx  Surg Hx  Fam Hx             Review of Systems   Constitutional: Negative for chills and fever.   HENT: Negative for  congestion, ear pain, hearing loss and sore throat.    Eyes: Negative for pain and visual disturbance.   Respiratory: Negative for cough and shortness of breath.    Cardiovascular: Negative for chest pain, palpitations and peripheral edema.   Gastrointestinal: Negative for abdominal pain, constipation, diarrhea, heartburn, hematochezia and nausea.   Breasts:  Negative for tenderness, breast mass and discharge.   Genitourinary: Negative for dysuria, frequency, genital sores, hematuria, pelvic pain, urgency, vaginal bleeding and vaginal discharge.   Musculoskeletal: Negative for arthralgias, joint swelling and myalgias.   Skin: Negative for rash.   Neurological: Negative for dizziness, weakness, headaches and paresthesias.   Psychiatric/Behavioral: Negative for mood changes. The patient is not nervous/anxious.           OBJECTIVE:   /68 (BP Location: Right arm, Patient Position: Sitting, Cuff Size: Adult Regular)   Pulse 80   Temp 97.9  F (36.6  C) (Temporal)   Resp 14   Ht 1.524 m (5')   Wt 56.2 kg (124 lb)   LMP 03/06/2023 (Exact Date)   SpO2 100%   BMI 24.22 kg/m    Physical Exam  GENERAL: healthy, alert and no distress  EYES: Eyes grossly normal to inspection, PERRL and conjunctivae and sclerae normal  HENT: ear canals and TM's normal, nose and mouth without ulcers or lesions  NECK: no adenopathy, no asymmetry, masses, or scars and thyroid normal to palpation  RESP: lungs clear to auscultation - no rales, rhonchi or wheezes  BREAST: normal without masses, tenderness or nipple discharge and no palpable axillary masses or adenopathy  CV: regular rate and rhythm, normal S1 S2, no S3 or S4, no murmur, click or rub, no peripheral edema and peripheral pulses strong  ABDOMEN: soft, nontender, no hepatosplenomegaly, no masses and bowel sounds normal   (female): normal female external genitalia, normal urethral meatus, vaginal mucosa pink, moist, well rugated, and normal cervix/adnexa/uterus without masses  or discharge  MS: no gross musculoskeletal defects noted, no edema  SKIN: no suspicious lesions or rashes  NEURO: Normal strength and tone, mentation intact and speech normal  PSYCH: mentation appears normal, affect normal/bright    Diagnostic Test Results:  Labs reviewed in Epic    ASSESSMENT/PLAN:   Ella was seen today for physical.    Diagnoses and all orders for this visit:    Routine general medical examination at a health care facility    Pap: done today    Immunizations: discussed; patient declined     Discussed healthy lifestyle and aging recommendations including regular exercise, adequate and regular sleep, 5+ fruits and veggies daily.    Surveillance for birth control, oral contraceptives  -     norgestim-eth estrad triphasic (TRI-SPRINTEC) 0.18/0.215/0.25 MG-35 MCG tablet; Take 1 tablet by mouth daily    Cervical cancer screening  -     Pap Screen with HPV - recommended age 30 - 65 years    Screening for HIV (human immunodeficiency virus)  Need for hepatitis C screening test    Routine screening discussed with patient as recommended by CDC for all adults; no particular risk factors noted.    Other orders  -     REVIEW OF HEALTH MAINTENANCE PROTOCOL ORDERS          COUNSELING:  Reviewed preventive health counseling, as reflected in patient instructions        She reports that she has never smoked. She has never used smokeless tobacco.      Brittni William MD  [facility]

## 2023-03-24 LAB
BKR LAB AP GYN ADEQUACY: NORMAL
BKR LAB AP GYN INTERPRETATION: NORMAL
BKR LAB AP HPV REFLEX: NORMAL
BKR LAB AP PREVIOUS ABNL DX: NORMAL
BKR LAB AP PREVIOUS ABNORMAL: NORMAL
PATH REPORT.COMMENTS IMP SPEC: NORMAL
PATH REPORT.COMMENTS IMP SPEC: NORMAL
PATH REPORT.RELEVANT HX SPEC: NORMAL

## 2023-03-28 LAB
HUMAN PAPILLOMA VIRUS 16 DNA: NEGATIVE
HUMAN PAPILLOMA VIRUS 18 DNA: NEGATIVE
HUMAN PAPILLOMA VIRUS FINAL DIAGNOSIS: NORMAL
HUMAN PAPILLOMA VIRUS OTHER HR: NEGATIVE

## 2024-02-16 ENCOUNTER — PATIENT OUTREACH (OUTPATIENT)
Dept: CARE COORDINATION | Facility: CLINIC | Age: 29
End: 2024-02-16

## 2024-02-16 ENCOUNTER — OFFICE VISIT (OUTPATIENT)
Dept: OBGYN | Facility: CLINIC | Age: 29
End: 2024-02-16
Payer: COMMERCIAL

## 2024-02-16 VITALS
HEIGHT: 60 IN | BODY MASS INDEX: 23.91 KG/M2 | HEART RATE: 79 BPM | OXYGEN SATURATION: 99 % | DIASTOLIC BLOOD PRESSURE: 65 MMHG | WEIGHT: 121.8 LBS | SYSTOLIC BLOOD PRESSURE: 105 MMHG

## 2024-02-16 DIAGNOSIS — Z30.41 SURVEILLANCE FOR BIRTH CONTROL, ORAL CONTRACEPTIVES: ICD-10-CM

## 2024-02-16 PROCEDURE — 99203 OFFICE O/P NEW LOW 30 MIN: CPT | Performed by: OBSTETRICS & GYNECOLOGY

## 2024-02-16 RX ORDER — NORGESTIMATE AND ETHINYL ESTRADIOL 7DAYSX3 28
1 KIT ORAL DAILY
Qty: 90 TABLET | Refills: 3 | Status: SHIPPED | OUTPATIENT
Start: 2024-02-16 | End: 2024-02-16

## 2024-02-16 RX ORDER — NORGESTIMATE AND ETHINYL ESTRADIOL 7DAYSX3 28
1 KIT ORAL DAILY
Qty: 90 TABLET | Refills: 4 | Status: SHIPPED | OUTPATIENT
Start: 2024-02-16

## 2024-02-16 NOTE — PROGRESS NOTES
[facility] - OBGYN    CC:birth control refill    S:Ella Cavazos is a 29 year old  who presents today for birth control refill.  Patient reports she is happy with her current OCP.  She has no complaints today.      OBGYN Hx:     Patient's last menstrual period was 2024.  Menses:monthly, regular, lasting 6-7 days and medium amount of bleeding  Sexually active with one male partner.  Her fiance.  They use condoms 25% of the time  STD Hx:chlamydia   Pap hx:3/17/23 NILM HPV negative    PMH: patient denies any history of HTN, VTE, or migraine with aura  PSH:  Past Surgical History:   Procedure Laterality Date    DENTAL SURGERY      wisdom teeth, uncomplicated       Meds:  Current Outpatient Medications   Medication    norgestim-eth estrad triphasic (TRI-SPRINTEC) 0.18/0.215/0.25 MG-35 MCG tablet     No current facility-administered medications for this visit.       Allergies:  Allergies   Allergen Reactions    Sulfa Antibiotics        SH:Denies any tobacco use    O: Patient Vitals for the past 24 hrs:   BP Pulse SpO2 Height Weight   24 1323 105/65 79 99 % 1.524 m (5') 55.2 kg (121 lb 12.8 oz)   ]  Exam:  General- awake, alert, answering questions appropriately, appears comfortable  CV- regular rate  Lung- breathing comfortably on room air    A&P: Ella Cavazos is a 29 year old  who presents today for birth control refill.     (Z30.41) Surveillance for birth control, oral contraceptives  Comment: Patient happy with current pill.  Refill.  Discussed recommendation to test for HIV and Hep C at least once in all adults.  Patient declines screening today and prefers to get it done in future when other labs are indicated.   Plan: norgestim-eth estrad triphasic (TRI-SPRINTEC)         0.18/0.215/0.25 MG-35 MCG tablet    Leanne Ross MD

## 2024-03-01 ENCOUNTER — PATIENT OUTREACH (OUTPATIENT)
Dept: CARE COORDINATION | Facility: CLINIC | Age: 29
End: 2024-03-01
Payer: COMMERCIAL

## 2024-05-04 ENCOUNTER — HEALTH MAINTENANCE LETTER (OUTPATIENT)
Age: 29
End: 2024-05-04